# Patient Record
Sex: FEMALE | Race: BLACK OR AFRICAN AMERICAN | NOT HISPANIC OR LATINO
[De-identification: names, ages, dates, MRNs, and addresses within clinical notes are randomized per-mention and may not be internally consistent; named-entity substitution may affect disease eponyms.]

---

## 2017-07-12 ENCOUNTER — CHARTING TRANS (OUTPATIENT)
Dept: OTHER | Age: 22
End: 2017-07-12

## 2018-03-13 ENCOUNTER — OFFICE VISIT (OUTPATIENT)
Dept: INTERNAL MEDICINE CLINIC | Facility: CLINIC | Age: 23
End: 2018-03-13

## 2018-03-13 VITALS
OXYGEN SATURATION: 99 % | TEMPERATURE: 99 F | HEART RATE: 86 BPM | BODY MASS INDEX: 25.79 KG/M2 | SYSTOLIC BLOOD PRESSURE: 124 MMHG | WEIGHT: 147.38 LBS | DIASTOLIC BLOOD PRESSURE: 84 MMHG | HEIGHT: 63.5 IN

## 2018-03-13 DIAGNOSIS — L70.0 ACNE VULGARIS: ICD-10-CM

## 2018-03-13 DIAGNOSIS — Z00.00 ROUTINE HEALTH MAINTENANCE: Primary | ICD-10-CM

## 2018-03-13 DIAGNOSIS — R53.83 OTHER FATIGUE: ICD-10-CM

## 2018-03-13 DIAGNOSIS — M26.9 JAW DEFORMITY: ICD-10-CM

## 2018-03-13 PROCEDURE — 99385 PREV VISIT NEW AGE 18-39: CPT | Performed by: INTERNAL MEDICINE

## 2018-03-13 PROCEDURE — 90714 TD VACC NO PRESV 7 YRS+ IM: CPT | Performed by: INTERNAL MEDICINE

## 2018-03-13 PROCEDURE — 90471 IMMUNIZATION ADMIN: CPT | Performed by: INTERNAL MEDICINE

## 2018-03-13 NOTE — PROGRESS NOTES
Denis Cordero is a 25year old female. Patient presents with:  Establish Care: Previous PCP Dr Christine Katz through [facility]. Physical: Requests referral for orthogathic Surgery Evaluation. Current dentist is Dr Mack Castillo. Has never had PAP.    Headache      HPI: History:  around 2015: OTHER SURGICAL HISTORY Right      Comment: ganglion cyst removal  No date: WISDOM TEETH REMOVED   Family History   Problem Relation Age of Onset   • Thyroid disease Mother    • diverticulitis [OTHER] Maternal Grandmother    • Breast Dr. Kelly Shah oral surgeon    3.  Acne vulgaris  Refer to derm Dr. Millie Galicia MD  3/13/2018  2:50 PM

## 2018-03-17 ENCOUNTER — LAB ENCOUNTER (OUTPATIENT)
Dept: LAB | Age: 23
End: 2018-03-17
Attending: INTERNAL MEDICINE
Payer: COMMERCIAL

## 2018-03-17 DIAGNOSIS — R53.83 OTHER FATIGUE: ICD-10-CM

## 2018-03-17 DIAGNOSIS — Z00.00 ROUTINE HEALTH MAINTENANCE: ICD-10-CM

## 2018-03-17 LAB
ALBUMIN SERPL BCP-MCNC: 4.1 G/DL (ref 3.5–4.8)
ALBUMIN/GLOB SERPL: 1.2 {RATIO} (ref 1–2)
ALP SERPL-CCNC: 43 U/L (ref 32–100)
ALT SERPL-CCNC: 13 U/L (ref 14–54)
ANION GAP SERPL CALC-SCNC: 6 MMOL/L (ref 0–18)
AST SERPL-CCNC: 18 U/L (ref 15–41)
BASOPHILS # BLD: 0.1 K/UL (ref 0–0.2)
BASOPHILS NFR BLD: 1 %
BILIRUB SERPL-MCNC: 0.6 MG/DL (ref 0.3–1.2)
BUN SERPL-MCNC: 11 MG/DL (ref 8–20)
BUN/CREAT SERPL: 16.2 (ref 10–20)
CALCIUM SERPL-MCNC: 9 MG/DL (ref 8.5–10.5)
CHLORIDE SERPL-SCNC: 105 MMOL/L (ref 95–110)
CHOLEST SERPL-MCNC: 185 MG/DL (ref 110–200)
CO2 SERPL-SCNC: 24 MMOL/L (ref 22–32)
CREAT SERPL-MCNC: 0.68 MG/DL (ref 0.5–1.5)
EOSINOPHIL # BLD: 0.1 K/UL (ref 0–0.7)
EOSINOPHIL NFR BLD: 1 %
ERYTHROCYTE [DISTWIDTH] IN BLOOD BY AUTOMATED COUNT: 12.6 % (ref 11–15)
GLOBULIN PLAS-MCNC: 3.4 G/DL (ref 2.5–3.7)
GLUCOSE SERPL-MCNC: 85 MG/DL (ref 70–99)
HCT VFR BLD AUTO: 38.2 % (ref 35–48)
HDLC SERPL-MCNC: 85 MG/DL
HGB BLD-MCNC: 12.7 G/DL (ref 12–16)
LDLC SERPL CALC-MCNC: 90 MG/DL (ref 0–99)
LYMPHOCYTES # BLD: 1.9 K/UL (ref 1–4)
LYMPHOCYTES NFR BLD: 36 %
MCH RBC QN AUTO: 29.6 PG (ref 27–32)
MCHC RBC AUTO-ENTMCNC: 33.2 G/DL (ref 32–37)
MCV RBC AUTO: 89.1 FL (ref 80–100)
MONOCYTES # BLD: 0.5 K/UL (ref 0–1)
MONOCYTES NFR BLD: 9 %
NEUTROPHILS # BLD AUTO: 2.8 K/UL (ref 1.8–7.7)
NEUTROPHILS NFR BLD: 53 %
NONHDLC SERPL-MCNC: 100 MG/DL
OSMOLALITY UR CALC.SUM OF ELEC: 279 MOSM/KG (ref 275–295)
PATIENT FASTING: YES
PLATELET # BLD AUTO: 257 K/UL (ref 140–400)
PMV BLD AUTO: 8.1 FL (ref 7.4–10.3)
POTASSIUM SERPL-SCNC: 4.2 MMOL/L (ref 3.3–5.1)
PROT SERPL-MCNC: 7.5 G/DL (ref 5.9–8.4)
RBC # BLD AUTO: 4.28 M/UL (ref 3.7–5.4)
SODIUM SERPL-SCNC: 135 MMOL/L (ref 136–144)
TRIGL SERPL-MCNC: 49 MG/DL (ref 1–149)
WBC # BLD AUTO: 5.4 K/UL (ref 4–11)

## 2018-03-17 PROCEDURE — 36415 COLL VENOUS BLD VENIPUNCTURE: CPT

## 2018-03-17 PROCEDURE — 82306 VITAMIN D 25 HYDROXY: CPT

## 2018-03-17 PROCEDURE — 85025 COMPLETE CBC W/AUTO DIFF WBC: CPT

## 2018-03-17 PROCEDURE — 80053 COMPREHEN METABOLIC PANEL: CPT

## 2018-03-17 PROCEDURE — 80061 LIPID PANEL: CPT

## 2018-03-19 ENCOUNTER — TELEPHONE (OUTPATIENT)
Dept: INTERNAL MEDICINE CLINIC | Facility: CLINIC | Age: 23
End: 2018-03-19

## 2018-03-19 LAB — 25(OH)D3 SERPL-MCNC: 10.3 NG/ML

## 2018-03-19 NOTE — TELEPHONE ENCOUNTER
Per [facility], coverage for underbite is not medical and will fall under dental insurance. Referral was cancelled.

## 2018-04-02 ENCOUNTER — TELEPHONE (OUTPATIENT)
Dept: INTERNAL MEDICINE CLINIC | Facility: CLINIC | Age: 23
End: 2018-04-02

## 2018-04-02 DIAGNOSIS — E55.9 VITAMIN D DEFICIENCY: Primary | ICD-10-CM

## 2018-04-03 RX ORDER — CHOLECALCIFEROL (VITAMIN D3) 1250 MCG
1 CAPSULE ORAL WEEKLY
Qty: 13 CAPSULE | Refills: 3 | Status: SHIPPED | OUTPATIENT
Start: 2018-04-03 | End: 2018-05-03

## 2018-04-03 NOTE — TELEPHONE ENCOUNTER
Called patient with Dr. Kallie Reinoso message. Sent in the rx for   Vitamin D. Art expressed understanding.

## 2018-04-03 NOTE — TELEPHONE ENCOUNTER
Please let pt know that all her labs were normal, except her vitamin D level is very low (10). I'd like her to start taking Vitamin D 50,000 units/week (#13, 3 RF).   Repeat a level in 6 months (I'll order)

## 2018-04-18 NOTE — TELEPHONE ENCOUNTER
Patient calling back re: referral to correct her underbite  States she needs a medical determination for oral surgery from Dr Jerome Lopez  - needs detailed information   Provided orthodontists information to obtain the detailed information  Pt sees Dr James Weston

## 2018-04-20 NOTE — TELEPHONE ENCOUNTER
Patient requests call back today from Dr Lilia Chester nurse re: message she left on 4/18    Pt can be reached at [phone]

## 2018-04-20 NOTE — TELEPHONE ENCOUNTER
Spoke with patient and explained to her that underbite does not fall under a medical condition therefore her medical insurance will not cover. Her or her mom will need to contact her dental insurance to find out if they will cover this issue.   Patient stat

## 2018-05-02 NOTE — TELEPHONE ENCOUNTER
To  -   Called patients mom who tried to explain the whole situation , she will contact patients dentist DR. Piyush Callejas who will contact DR. JOHNSON Tuesday 5/8 and talk to her regarding what is needed

## 2018-05-14 ENCOUNTER — TELEPHONE (OUTPATIENT)
Dept: DERMATOLOGY CLINIC | Facility: CLINIC | Age: 23
End: 2018-05-14

## 2018-05-14 ENCOUNTER — OFFICE VISIT (OUTPATIENT)
Dept: DERMATOLOGY CLINIC | Facility: CLINIC | Age: 23
End: 2018-05-14

## 2018-05-14 DIAGNOSIS — L70.0 ACNE VULGARIS: Primary | ICD-10-CM

## 2018-05-14 PROCEDURE — 99202 OFFICE O/P NEW SF 15 MIN: CPT | Performed by: DERMATOLOGY

## 2018-05-14 PROCEDURE — 99212 OFFICE O/P EST SF 10 MIN: CPT | Performed by: DERMATOLOGY

## 2018-05-14 RX ORDER — DOXYCYCLINE HYCLATE 50 MG/1
50 CAPSULE ORAL 2 TIMES DAILY
Qty: 60 CAPSULE | Refills: 12 | Status: SHIPPED | OUTPATIENT
Start: 2018-05-14 | End: 2018-06-14

## 2018-05-14 RX ORDER — CHOLECALCIFEROL (VITAMIN D3) 1250 MCG
CAPSULE ORAL
COMMUNITY
End: 2019-01-10

## 2018-05-14 RX ORDER — CLINDAMYCIN PHOSPHATE AND TRETINOIN 10; .25 MG/G; MG/G
1 GEL TOPICAL NIGHTLY
Qty: 45 G | Refills: 3 | Status: SHIPPED | OUTPATIENT
Start: 2018-05-14 | End: 2018-06-14

## 2018-05-14 NOTE — TELEPHONE ENCOUNTER
Received fax from [employer] stating Clindamycin Tretinoin requires PA. (Fax in [facility]).

## 2018-05-15 NOTE — TELEPHONE ENCOUNTER
Called  Prime and Veltin is an excluded drug. 1% clinda and .025 tretinoin will go through insurance separately, but would be 70$ each. Only other option would be to appeal this medication. [phone]    Please advise.

## 2018-05-15 NOTE — TELEPHONE ENCOUNTER
Please try pa--may still need to change to generic tretinoin --+/- clindamycin pt on po so may not need topical antibiotic for now

## 2018-05-16 RX ORDER — ADAPALENE 3 MG/G
1 GEL TOPICAL NIGHTLY
Qty: 45 G | Refills: 12 | Status: SHIPPED | OUTPATIENT
Start: 2018-05-16 | End: 2018-06-14

## 2018-05-17 NOTE — TELEPHONE ENCOUNTER
differin 0.3% sent to [employer]. Please let pt know. If not covered will need other alternative.

## 2018-05-27 NOTE — PROGRESS NOTES
Odalis Miller is a 26year old female. Patient presents with:  Acne: new pt. referred by Dr. Kiesha Trevino for acne to face. Tried RX topical medication in past but doesn't remember what. States BPO irritate kalani skin.              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 Comedones grade 2-3 see medications in grid. Skin care regimen discussed at length including cleansers,otc products, face washing, sunscreen.   Recheck in 6 -8 weeks if no improvemen this encounter. 5/27/2018  Jory Paniagua      The patient indicates understanding of these issues and agrees to the plan.   The patient is asked to return as noted in follow-up / above

## 2018-06-04 ENCOUNTER — TELEPHONE (OUTPATIENT)
Dept: INTERNAL MEDICINE CLINIC | Facility: CLINIC | Age: 23
End: 2018-06-04

## 2018-06-14 ENCOUNTER — OFFICE VISIT (OUTPATIENT)
Dept: INTERNAL MEDICINE CLINIC | Facility: CLINIC | Age: 23
End: 2018-06-14

## 2018-06-14 VITALS
TEMPERATURE: 99 F | HEIGHT: 63.5 IN | OXYGEN SATURATION: 99 % | WEIGHT: 149 LBS | HEART RATE: 95 BPM | BODY MASS INDEX: 26.08 KG/M2 | SYSTOLIC BLOOD PRESSURE: 128 MMHG | DIASTOLIC BLOOD PRESSURE: 82 MMHG

## 2018-06-14 DIAGNOSIS — R51.9 TEMPORAL HEADACHE: Primary | ICD-10-CM

## 2018-06-14 DIAGNOSIS — L70.0 ACNE VULGARIS: ICD-10-CM

## 2018-06-14 PROCEDURE — 99212 OFFICE O/P EST SF 10 MIN: CPT | Performed by: INTERNAL MEDICINE

## 2018-06-14 PROCEDURE — 99214 OFFICE O/P EST MOD 30 MIN: CPT | Performed by: INTERNAL MEDICINE

## 2018-06-14 NOTE — PROGRESS NOTES
Pascale Ghosh is a 26year old female. Patient presents with:  Headache: Complaints of frequent headaches around the temporal area - since 1 year   Acne: Complaints of acne - needs dermatology referal for Dr. Compa Hi   Other: Needs referral for oral °F (37.2 °C) (Oral)   Ht 5' 3.5\" (1.613 m)   Wt 149 lb (67.6 kg)   LMP 05/23/2018 (Exact Date)   SpO2 99%   BMI 25.98 kg/m²   GENERAL: well developed, well nourished, in no apparent distress  HEENT: no temporal scalp tenderness  NECK: supple, no adenopath

## 2018-07-02 ENCOUNTER — TELEPHONE (OUTPATIENT)
Dept: INTERNAL MEDICINE CLINIC | Facility: CLINIC | Age: 23
End: 2018-07-02

## 2018-07-02 DIAGNOSIS — M26.9 JAW DEFORMITY: Primary | ICD-10-CM

## 2018-07-02 NOTE — TELEPHONE ENCOUNTER
Dr. Laurance Goodpasture' message relayed to pt who verbalized understanding.   Pt will call insurance company and call back with covered oral surgeon for referral.

## 2018-07-02 NOTE — TELEPHONE ENCOUNTER
Pt called to check that Dr Abraham Arita received letter from her orthodontist re: needing referral to oral surgeon  - pt was advised letter was received in doctors mail slot    Please call pt to confirm oral surgeon referrral [phone]

## 2018-07-03 NOTE — TELEPHONE ENCOUNTER
Pt called her insurance company, and according to her insurance, all oral surgeons were insured. Pt said Dr Lata Juarez recommended Dr Kishan Blake, and he is covered by her insurance if that's the route Dr Lata Juarez would like to go.  If we can please send over the referral and le

## 2018-07-03 NOTE — TELEPHONE ENCOUNTER
Referral status of \"Pend. \" To [facility] to complete and please fax to Dr. Yady Blanco at [phone].

## 2018-07-03 NOTE — TELEPHONE ENCOUNTER
To Dr. Giovanna Odell - see below - new referral for Dr. Prudencio Allen pended above  Pt given Dr. Berman Reusing name/phone number - understanding verbalized. Dr. Prudencio Allen fax: [phone], P) [phone].

## 2018-07-06 NOTE — TELEPHONE ENCOUNTER
Pt's mother Phani Check called to check status on referral for oral surgeon  Please call patient when authorized so she can schedule an appt    Endy Keller [phone]

## 2018-07-10 ENCOUNTER — TELEPHONE (OUTPATIENT)
Dept: INTERNAL MEDICINE CLINIC | Facility: CLINIC | Age: 23
End: 2018-07-10

## 2018-07-10 NOTE — TELEPHONE ENCOUNTER
Please fax documentation of the orthodontist's notes supporting request to see oral surgeon. Please fax documentation to [facility] at [phone].      Thank you, Lenard

## 2018-07-10 NOTE — TELEPHONE ENCOUNTER
To Dr. Cody White - see below - do you have orthodontist notes? Do not see under \"media\" or \"notes\".

## 2018-07-10 NOTE — TELEPHONE ENCOUNTER
To Dr. Mirna Burr - Orthodontist office called [phone] - they will refax to us orthodontist's note to support need for oral surgeon. Mariela Pretty will place on your desk when it arrives. They will also directly fax notes to [facility] - fax number below.

## 2018-07-17 ENCOUNTER — LAB ENCOUNTER (OUTPATIENT)
Dept: LAB | Facility: HOSPITAL | Age: 23
End: 2018-07-17
Attending: INTERNAL MEDICINE
Payer: COMMERCIAL

## 2018-07-17 ENCOUNTER — OFFICE VISIT (OUTPATIENT)
Dept: OBGYN CLINIC | Facility: CLINIC | Age: 23
End: 2018-07-17

## 2018-07-17 VITALS
HEART RATE: 92 BPM | DIASTOLIC BLOOD PRESSURE: 80 MMHG | SYSTOLIC BLOOD PRESSURE: 134 MMHG | BODY MASS INDEX: 26.05 KG/M2 | WEIGHT: 147 LBS | HEIGHT: 63 IN

## 2018-07-17 DIAGNOSIS — Z01.419 ENCOUNTER FOR GYNECOLOGICAL EXAMINATION WITHOUT ABNORMAL FINDING: Primary | ICD-10-CM

## 2018-07-17 DIAGNOSIS — Z11.3 SCREEN FOR STD (SEXUALLY TRANSMITTED DISEASE): ICD-10-CM

## 2018-07-17 DIAGNOSIS — E55.9 VITAMIN D DEFICIENCY: ICD-10-CM

## 2018-07-17 DIAGNOSIS — R51.9 TEMPORAL HEADACHE: ICD-10-CM

## 2018-07-17 LAB
CRP SERPL-MCNC: <0.5 MG/DL (ref 0–0.9)
ERYTHROCYTE [SEDIMENTATION RATE] IN BLOOD: 10 MM/HR (ref 0–20)
TSH SERPL-ACNC: 1.07 UIU/ML (ref 0.45–5.33)

## 2018-07-17 PROCEDURE — 36415 COLL VENOUS BLD VENIPUNCTURE: CPT

## 2018-07-17 PROCEDURE — 84443 ASSAY THYROID STIM HORMONE: CPT | Performed by: INTERNAL MEDICINE

## 2018-07-17 PROCEDURE — 86780 TREPONEMA PALLIDUM: CPT

## 2018-07-17 PROCEDURE — 85652 RBC SED RATE AUTOMATED: CPT

## 2018-07-17 PROCEDURE — 82306 VITAMIN D 25 HYDROXY: CPT

## 2018-07-17 PROCEDURE — 86140 C-REACTIVE PROTEIN: CPT

## 2018-07-17 PROCEDURE — 87389 HIV-1 AG W/HIV-1&-2 AB AG IA: CPT

## 2018-07-17 PROCEDURE — 99395 PREV VISIT EST AGE 18-39: CPT | Performed by: OBSTETRICS & GYNECOLOGY

## 2018-07-17 NOTE — TELEPHONE ENCOUNTER
Pt is checking on status of Referral, pt is waiting to schedule her melina,  Please call pt [phone] when complete, tasked to managed care high

## 2018-07-17 NOTE — PROGRESS NOTES
Ke Kendall is a 26year old female  Patient's last menstrual period was 2018 (approximate). Patient presents with:  Gyn Exam: NP, Annual  .   Her cycles are  regular. She has no complaints.   Not currently sexually active but would like s itching  Musculoskeletal:  denies back pain. Skin/Breast:  Denies any breast pain, lumps, or discharge. Neurological:  denies headaches, extremity weakness or numbness. Psychiatric: denies depression or anxiety.   Endocrine:   denies excessive thirst or

## 2018-07-18 LAB
25(OH)D3 SERPL-MCNC: 73.3 NG/ML
C TRACH DNA SPEC QL NAA+PROBE: NEGATIVE
HIV1+2 AB SERPL QL IA: NONREACTIVE
N GONORRHOEA DNA SPEC QL NAA+PROBE: NEGATIVE
T PALLIDUM AB SER QL: NEGATIVE
T VAGINALIS RRNA SPEC QL NAA+PROBE: NEGATIVE

## 2018-07-19 ENCOUNTER — TELEPHONE (OUTPATIENT)
Dept: INTERNAL MEDICINE CLINIC | Facility: CLINIC | Age: 23
End: 2018-07-19

## 2018-07-19 NOTE — TELEPHONE ENCOUNTER
Pt is calling regarding the MRI of Brain, pt still has not heard anything from the Ins for authorization  Pt was told to call office if she didn't hear.   Please call pt [phone]    Tasked to nursing

## 2018-07-20 NOTE — TELEPHONE ENCOUNTER
No authorization is needed for this patient because she has the [address]  doesn't receive this referrals because they auto approve. I will inform patient.     Thank you,   [address]

## 2018-07-21 NOTE — TELEPHONE ENCOUNTER
Per [facility]     \"I would need to know if the patient's general health is affected by this malocclusion. Is patient having difficulty with eating, pain, conservative treatment for the pain, etc.? \"     Thank you, Lenard

## 2018-07-23 ENCOUNTER — TELEPHONE (OUTPATIENT)
Dept: INTERNAL MEDICINE CLINIC | Facility: CLINIC | Age: 23
End: 2018-07-23

## 2018-07-23 DIAGNOSIS — M26.629 CHRONIC TMJ PAIN: Primary | ICD-10-CM

## 2018-07-23 DIAGNOSIS — G89.29 CHRONIC TMJ PAIN: Primary | ICD-10-CM

## 2018-07-24 NOTE — TELEPHONE ENCOUNTER
Pt is having TMJ pain -- that's what I know. This is not my area of expertise. I am not a dentist/orthodontist/oral surgeon. I have no idea if this is a progressive problem or what the long-term consequences are.   These really are questions that need to

## 2018-07-25 NOTE — TELEPHONE ENCOUNTER
I already sent a response back to Gloria, which essentially was that I have no clue and that her orthodontist needs to address those issues

## 2018-07-31 NOTE — TELEPHONE ENCOUNTER
[facility] address her question upon her return next week.

## 2018-07-31 NOTE — TELEPHONE ENCOUNTER
Per [facility]     [employer] will not cover a orthodontist visit to assess patient. But if PCP is referring for the TMJ pain the diagnosis code will need to be updated and we will be able to process for Oral Surgery. Please advise. \"

## 2018-07-31 NOTE — TELEPHONE ENCOUNTER
(on call) would you be able to advise on this or ok to wait for . Ok to update the diagnosis code to TMJ pain?

## 2018-08-02 ENCOUNTER — HOSPITAL ENCOUNTER (OUTPATIENT)
Dept: MRI IMAGING | Facility: HOSPITAL | Age: 23
Discharge: HOME | End: 2018-08-02
Attending: INTERNAL MEDICINE
Payer: COMMERCIAL

## 2018-08-02 DIAGNOSIS — R51.9 TEMPORAL HEADACHE: ICD-10-CM

## 2018-08-02 PROCEDURE — A9576 INJ PROHANCE MULTIPACK: HCPCS | Performed by: INTERNAL MEDICINE

## 2018-08-02 PROCEDURE — 70553 MRI BRAIN STEM W/O & W/DYE: CPT | Performed by: INTERNAL MEDICINE

## 2018-08-05 NOTE — TELEPHONE ENCOUNTER
Here is what I need:  Call patient and ask her EXACTLY what her symptoms are from this jaw malocclusion (TMJ pain? Chewing difficulty? Speech difficulty?).   Then please type a letter to [employer] outlining all of her symptoms and why (medically

## 2018-08-06 NOTE — TELEPHONE ENCOUNTER
Spoke with pt: Pt states chewing difficulty is a major problem - instead of using front teeth to bite, pt has to use back teeth. Only 4 upper teeth (2 on right, 2 on left) that make connection to bottom teeth. Severe underbite.   To Lenard/managed care - see

## 2018-08-07 NOTE — TELEPHONE ENCOUNTER
Hi, The diagnosis code on this referral is not covered under the medical diagnosis (per ins co guidelines) and can not be auth'd. However, Dr's notes state the pt has TMJ pain. TMJ pain is covered under medical necessity and would be auth'd.  If the diagnos

## 2018-11-03 VITALS
BODY MASS INDEX: 26.22 KG/M2 | RESPIRATION RATE: 18 BRPM | HEART RATE: 88 BPM | SYSTOLIC BLOOD PRESSURE: 124 MMHG | HEIGHT: 63 IN | OXYGEN SATURATION: 98 % | TEMPERATURE: 98.3 F | DIASTOLIC BLOOD PRESSURE: 82 MMHG | WEIGHT: 148 LBS

## 2019-01-03 ENCOUNTER — OFFICE VISIT (OUTPATIENT)
Dept: INTERNAL MEDICINE CLINIC | Facility: CLINIC | Age: 24
End: 2019-01-03

## 2019-01-03 VITALS
HEART RATE: 110 BPM | TEMPERATURE: 98 F | SYSTOLIC BLOOD PRESSURE: 132 MMHG | WEIGHT: 148 LBS | BODY MASS INDEX: 26 KG/M2 | DIASTOLIC BLOOD PRESSURE: 86 MMHG | OXYGEN SATURATION: 98 %

## 2019-01-03 DIAGNOSIS — M67.432 GANGLION CYST OF DORSUM OF LEFT WRIST: Primary | ICD-10-CM

## 2019-01-03 PROCEDURE — 99213 OFFICE O/P EST LOW 20 MIN: CPT | Performed by: INTERNAL MEDICINE

## 2019-01-03 PROCEDURE — 90471 IMMUNIZATION ADMIN: CPT | Performed by: INTERNAL MEDICINE

## 2019-01-03 PROCEDURE — 90686 IIV4 VACC NO PRSV 0.5 ML IM: CPT | Performed by: INTERNAL MEDICINE

## 2019-01-03 NOTE — PROGRESS NOTES
Abbie Wang is a 26year old female. Patient presents with:  Cyst: patient believes she has a ganglion cyst on left wrist.     HPI:     Pt notes a ganglion cyst on left dorsal wrist.  Has had for awhile but now getting bigger and causing discomfort.   Had

## 2019-01-10 ENCOUNTER — OFFICE VISIT (OUTPATIENT)
Dept: SURGERY | Facility: CLINIC | Age: 24
End: 2019-01-10

## 2019-01-10 DIAGNOSIS — M67.432 GANGLION OF LEFT WRIST: Primary | ICD-10-CM

## 2019-01-10 PROCEDURE — 99243 OFF/OP CNSLTJ NEW/EST LOW 30: CPT | Performed by: PLASTIC SURGERY

## 2019-01-10 PROCEDURE — 99212 OFFICE O/P EST SF 10 MIN: CPT | Performed by: PLASTIC SURGERY

## 2019-01-10 RX ORDER — HYDROCODONE BITARTRATE AND ACETAMINOPHEN 7.5; 325 MG/1; MG/1
TABLET ORAL
Qty: 25 TABLET | Refills: 0 | Status: SHIPPED | OUTPATIENT
Start: 2019-01-10 | End: 2019-06-03 | Stop reason: WASHOUT

## 2019-01-10 NOTE — H&P
Donna Swanson is a 26year old female that presents with Patient presents with:  Ganglion: Left dorsal wrist  .    REFERRED BY:  Mily Pineda    Pacemaker: No  Latex Allergy: no  Coumadin: No  Plavix: No  Other anticoagulants: No  Cardiac stents: No Transportation needs - medical: Not on file      Transportation needs - non-medical: Not on file    Occupational History      Not on file    Tobacco Use      Smoking status: Never Smoker      Smokeless tobacco: Never Used    Substance and Sexual Activity 3 cm transverse scar    ASSESSMENT/PLAN:     GANGLION    left dorsal hand    We discussed what a ganglion/mass is, including treatment options. Questions were answered and the patient wishes to proceed with treatment.      This ganglion/mass should be exci

## 2019-02-04 ENCOUNTER — TELEPHONE (OUTPATIENT)
Dept: INTERNAL MEDICINE CLINIC | Facility: CLINIC | Age: 24
End: 2019-02-04

## 2019-02-04 DIAGNOSIS — M26.02 MAXILLARY HYPOPLASIA: Primary | ICD-10-CM

## 2019-02-04 DIAGNOSIS — M26.03 MANDIBULAR HYPERPLASIA: ICD-10-CM

## 2019-02-04 NOTE — TELEPHONE ENCOUNTER
[facility] asking for referral Dr.Phillip Reno Louis  CT scan in office and revaluation    [facility]#[id number]  Ph# [phone] Rebecca Irwin

## 2019-02-04 NOTE — TELEPHONE ENCOUNTER
Called [facility] and s/w Jennifer Morrison who states another referral is needed for re-eval and for patient to have CT/head in preparation for upcoming oral surgery, not yet scheduled. States initial consult referral came from Dr. Lei Spicer.      To DR ELIZABETH romero

## 2019-03-13 ENCOUNTER — TELEPHONE (OUTPATIENT)
Dept: INTERNAL MEDICINE CLINIC | Facility: CLINIC | Age: 24
End: 2019-03-13

## 2019-03-13 NOTE — TELEPHONE ENCOUNTER
[facility] called to advise -     Pt is not going to see Dr Charlotte Reyes anymore &  was given referrals with letters & her records to see    Dr Lance Isabel @ [facility]      or    [facility]ib[facility] [place] is not sure pt

## 2019-03-13 NOTE — TELEPHONE ENCOUNTER
Routed to managed care-- is this something that you can help with? Or just have patient call herself? Please advise.  Thanks!!

## 2019-03-13 NOTE — TELEPHONE ENCOUNTER
I don't know either. That is the pt's responsibility to find out. She can call them and decide who she wants (and is able) to see, and then let us know.

## 2019-03-14 ENCOUNTER — OFFICE VISIT (OUTPATIENT)
Dept: INTERNAL MEDICINE CLINIC | Facility: CLINIC | Age: 24
End: 2019-03-14

## 2019-03-14 ENCOUNTER — TELEPHONE (OUTPATIENT)
Dept: INTERNAL MEDICINE CLINIC | Facility: CLINIC | Age: 24
End: 2019-03-14

## 2019-03-14 VITALS
HEIGHT: 63 IN | HEART RATE: 86 BPM | BODY MASS INDEX: 26.93 KG/M2 | SYSTOLIC BLOOD PRESSURE: 140 MMHG | WEIGHT: 152 LBS | DIASTOLIC BLOOD PRESSURE: 108 MMHG | TEMPERATURE: 98 F | OXYGEN SATURATION: 99 %

## 2019-03-14 DIAGNOSIS — M26.213 MALOCCLUSION, ANGLE'S CLASS III: Primary | ICD-10-CM

## 2019-03-14 DIAGNOSIS — R03.0 ELEVATED BLOOD PRESSURE READING: ICD-10-CM

## 2019-03-14 DIAGNOSIS — M26.29 MALOCCLUSION WITH OPEN BITE: ICD-10-CM

## 2019-03-14 PROCEDURE — 99212 OFFICE O/P EST SF 10 MIN: CPT | Performed by: INTERNAL MEDICINE

## 2019-03-14 PROCEDURE — 99213 OFFICE O/P EST LOW 20 MIN: CPT | Performed by: INTERNAL MEDICINE

## 2019-03-14 NOTE — TELEPHONE ENCOUNTER
Below are the in network Oral and Maxillofacial surgeons.      Thank you,    [facility]

## 2019-03-14 NOTE — PROGRESS NOTES
Manoj Torres is a 26year old female. Patient presents with:  Referral: Pt needs a referral for oral surgeon - pt needs jaw surgery to correct her underbite.      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 Malocclusion with open bite  Pt given referral to Dr. Nisha Gleason -- in network OM. She also requests referrals to Arjun Duran/[facility], although they do not appear to be in network    3.  Elevated blood pressur

## 2019-03-14 NOTE — TELEPHONE ENCOUNTER
Please fax her a list of oral surgeons in her network (see previous TT note). Have her or her mom call and find out which one of them, if any, do the procedure.   I can then do the proper referral.  If none of them do it, then she may need to go out of net

## 2019-03-14 NOTE — TELEPHONE ENCOUNTER
Pt was seen by Dr Ciarra Beyer today, was given referral for a oral surgeon, Dr Vida Cloud  This surgeon does not do the type of surgery pt needs  Please call with addt'l recommendations  Tasked to nursing

## 2019-03-14 NOTE — TELEPHONE ENCOUNTER
Spoke with patient and relayed Dr. Tima Tao message. Patient verbalized understanding. She will check on in-network doctors first and then let us know if she needs to go out of network.  I sent her a Madeleine Markett message with the names on the in-network oral surgeons

## 2019-03-16 NOTE — TELEPHONE ENCOUNTER
The referral to Dr. Rajwinder Owusu will be reviewed by Eduar Schmitz for medical necessity, all the other providers are out of network.      Thank you, Loretta Gilliland.

## 2019-04-09 ENCOUNTER — PATIENT MESSAGE (OUTPATIENT)
Dept: INTERNAL MEDICINE CLINIC | Facility: CLINIC | Age: 24
End: 2019-04-09

## 2019-04-10 NOTE — TELEPHONE ENCOUNTER
From: Mikki Kayser  To: Philip Le MD  Sent: 4/9/2019 7:50 PM CDT  Subject: Other    Hi Dr. Giovanna Odell,    I was wondering what needs to be done on your part about getting approval for my jaw surgery out of network.  The doctor that your wrote the re

## 2019-05-14 ENCOUNTER — OFFICE VISIT (OUTPATIENT)
Dept: INTERNAL MEDICINE CLINIC | Facility: CLINIC | Age: 24
End: 2019-05-14

## 2019-05-14 VITALS
WEIGHT: 152 LBS | TEMPERATURE: 98 F | BODY MASS INDEX: 26.93 KG/M2 | DIASTOLIC BLOOD PRESSURE: 90 MMHG | OXYGEN SATURATION: 98 % | SYSTOLIC BLOOD PRESSURE: 130 MMHG | HEART RATE: 89 BPM | HEIGHT: 63 IN

## 2019-05-14 DIAGNOSIS — M26.29 MALOCCLUSION WITH OPEN BITE: ICD-10-CM

## 2019-05-14 DIAGNOSIS — Z00.00 ROUTINE HEALTH MAINTENANCE: ICD-10-CM

## 2019-05-14 DIAGNOSIS — I10 HYPERTENSION, UNSPECIFIED TYPE: ICD-10-CM

## 2019-05-14 DIAGNOSIS — M26.213 MALOCCLUSION, ANGLE'S CLASS III: Primary | ICD-10-CM

## 2019-05-14 PROCEDURE — 99214 OFFICE O/P EST MOD 30 MIN: CPT | Performed by: INTERNAL MEDICINE

## 2019-05-14 PROCEDURE — 99212 OFFICE O/P EST SF 10 MIN: CPT | Performed by: INTERNAL MEDICINE

## 2019-05-14 RX ORDER — CEPHALEXIN 500 MG/1
500 CAPSULE ORAL 3 TIMES DAILY
COMMUNITY
End: 2019-06-03 | Stop reason: ALTCHOICE

## 2019-05-14 NOTE — PROGRESS NOTES
Leonard Garcia is a 22year old female. Patient presents with:  Referral: Patient would like to discuss referral for oral surgeon. She states she was referred to Dr. Dario Nance but he does not perform the procedure she needs.  She wonders if she needs to go ou 149 lb (67.6 kg)    Body mass index is 26.93 kg/m².       EXAM:   /90 (BP Location: Right arm, Patient Position: Sitting, Cuff Size: adult)   Pulse 89   Temp 98.2 °F (36.8 °C) (Oral)   Ht 5' 3\" (1.6 m)   Wt 152 lb (68.9 kg)   SpO2 98%   BMI 26.93 kg/

## 2019-05-20 ENCOUNTER — TELEPHONE (OUTPATIENT)
Dept: CASE MANAGEMENT | Age: 24
End: 2019-05-20

## 2019-05-20 ENCOUNTER — TELEPHONE (OUTPATIENT)
Dept: SURGERY | Facility: CLINIC | Age: 24
End: 2019-05-20

## 2019-05-20 NOTE — TELEPHONE ENCOUNTER
Dr. Sherlyn Sylvester,     Patient has seen Dr. Mimi Mayer was scheduled for Leforte I maxillary osteotomy, Bilateral sagittal split osteotomy, model surgery/measurements surgery in January. I'm not sure if she had the surgery. However, if there is an in-network provider equipped to perform the surgery, [facility] will not allow an out of network provider. [facility] is completely out of network. To process this referral, we need Dr. Isatu Coates office notes stating he can't perform this surgery with a notation that he has referred her to Dr. Darlys Hodgkin at [facility], patient can't self-direct care. Referral will be canceled until this information is received, reviewed and approved by you.      Thank you,   [address]

## 2019-05-21 NOTE — TELEPHONE ENCOUNTER
I have literally entered about 6 different referrals for this patient b/c each MD she calls or sees then tells her he/she does not do that procedure. I have no freaking clue who does this procedure because I have never even heard of it. 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 This has been beyond a waste of my time. Please please please help me to find someone who does this procedure -- whether in network or not. I don't give a crap. This poor girl has been dealing with this for the past year. Shameful.

## 2019-05-22 NOTE — TELEPHONE ENCOUNTER
I have spoke to Dr. Ramya Melton office, they has stated they are awaiting for the patient to complete her Orthodontic care. Once completed, he can do the surgery. I've asked them to send over the consultation notes for your review. With all things considered, you are able to speak to Dr. Je Enciso ([facility]) to provide the information you gave me. He is the decision-making in allowing out of network providers.

## 2019-05-23 ENCOUNTER — PATIENT MESSAGE (OUTPATIENT)
Dept: INTERNAL MEDICINE CLINIC | Facility: CLINIC | Age: 24
End: 2019-05-23

## 2019-05-23 ENCOUNTER — TELEPHONE (OUTPATIENT)
Dept: INTERNAL MEDICINE CLINIC | Facility: CLINIC | Age: 24
End: 2019-05-23

## 2019-05-23 DIAGNOSIS — M26.213 MALOCCLUSION, ANGLE'S CLASS III: Primary | ICD-10-CM

## 2019-05-23 DIAGNOSIS — M26.29 MALOCCLUSION WITH OPEN BITE: ICD-10-CM

## 2019-05-23 NOTE — TELEPHONE ENCOUNTER
From: Roxanne Betancourt  To: Anselmo Morse MD  Sent: 5/23/2019 3:59 PM CDT  Subject: Referral Request    Hi Dr. Zulema Brown,     Both referrals were recently cancelled, which lead to us calling [place] providers to see why.  They said that in order to get a

## 2019-05-23 NOTE — TELEPHONE ENCOUNTER
Vira message from 5/23 copied in to TT. \"Hi Dr. Marco Kunz,         Both referrals were recently cancelled, which lead to us calling [place] providers to see why.  They said that in order to get a referral approved you would have to submit and new re

## 2019-05-28 NOTE — TELEPHONE ENCOUNTER
From [facility] note from Negra Duncan on referral:  Kami Delvallee will be canceled as per [facility] and [facility] pt is self directing and was not referred out by Dr. Shara Monson.  [facility] checked with Dr. Shara Monson office and Dr. Shara Monson can do the procedure and is not referr

## 2019-05-30 NOTE — TELEPHONE ENCOUNTER
Spoke with patient who reports that her mom tried to reach Dr. Mireles Mouse office to see if he will perform surgery, but he will be out of office until next Wednesday.  They plan on calling him again after he is in office and will follow up with us after they get

## 2019-06-03 ENCOUNTER — OFFICE VISIT (OUTPATIENT)
Dept: SURGERY | Facility: CLINIC | Age: 24
End: 2019-06-03

## 2019-06-03 DIAGNOSIS — M67.432 GANGLION OF LEFT WRIST: Primary | ICD-10-CM

## 2019-06-03 PROCEDURE — 99212 OFFICE O/P EST SF 10 MIN: CPT | Performed by: PLASTIC SURGERY

## 2019-06-03 PROCEDURE — 99214 OFFICE O/P EST MOD 30 MIN: CPT | Performed by: PLASTIC SURGERY

## 2019-06-03 RX ORDER — HYDROCODONE BITARTRATE AND ACETAMINOPHEN 7.5; 325 MG/1; MG/1
TABLET ORAL
Qty: 25 TABLET | Refills: 0 | Status: SHIPPED | OUTPATIENT
Start: 2019-06-03 | End: 2019-07-18

## 2019-06-03 NOTE — PROGRESS NOTES
Pt here for ganglion cyst to left dorsal wrist  Symptoms the same, no changes since last office visit 1/10/19.   Pt here to discuss and schedule surgery

## 2019-06-03 NOTE — H&P
Pacemaker: No  Latex Allergy: no  Coumadin: No  Plavix: No  Other anticoagulants: No  Cardiac stents: No    HAND DOMINANCE:  Right  Profession: Student    RECONSTRUCTIVE HISTORY    SUN EXPOSURE   Current yes   Past yes   Sunburns no   Tanning salons curr permanent pain, stiffness, weakness, and loss of function. Even with satisfactory healing, the hand/digit may not regain normal ROM or normal function.             UPDATED PMH:         Current Outpatient Medications:  HYDROcodone-acetaminophen 7.5-325 MG activity: Not on file    Other Topics      Concerns:        [place]rew up [place] a farm: Not Asked        History of tanning: Not Asked        Outdoor occupation: Not Asked        Pt has a pacemaker: No        Pt has a defibrillator: No        Breast feeding: Not As

## 2019-06-05 ENCOUNTER — TELEPHONE (OUTPATIENT)
Dept: INTERNAL MEDICINE CLINIC | Facility: CLINIC | Age: 24
End: 2019-06-05

## 2019-06-05 DIAGNOSIS — M26.29 MALOCCLUSION WITH OPEN BITE: ICD-10-CM

## 2019-06-05 DIAGNOSIS — M26.213 MALOCCLUSION, ANGLE'S CLASS III: Primary | ICD-10-CM

## 2019-06-05 NOTE — TELEPHONE ENCOUNTER
Dr Danisha Sanders office faxed over his notes on the pt. The fax was placed in Dr Hannah Paulino' mailbox.

## 2019-06-05 NOTE — TELEPHONE ENCOUNTER
Referral done for Dr. Bri Brown.     Please ask pt to send (fax or bring to office)  Dr. Pretty Innocent referral and office notes and then send to [facility] as below (She cannot email them, as we do not have email capabilities)

## 2019-06-05 NOTE — TELEPHONE ENCOUNTER
Dr. Nitin Monson office notes faxed to [facility]; fax confirmation received. Notes to Dr. Ottoniel Sal for review. Routed to Managed care.

## 2019-06-05 NOTE — TELEPHONE ENCOUNTER
[facility]  My mom spoke with Dr. Karen Betancourt office and he still referred me to see Dr. Elizabeth Alston of [facility].  He also made an official referral. I was wondering if you could redo the referral for him, including Dr. Karen Betancourt clinical notes, and his r

## 2019-06-06 NOTE — TELEPHONE ENCOUNTER
Spoke to Tang Wallis at [facility]. She doesn't need us to correct referral.  Asked that we refax  [address] notes. Notes faxed and confirmation received.

## 2019-06-06 NOTE — TELEPHONE ENCOUNTER
Mother called, asking that we correct referral for Dr. Eli Velasquez. Needs to state external and urgent. Asked that we fax Dr. Mark Mccall office notes to managed care.

## 2019-06-06 NOTE — TELEPHONE ENCOUNTER
Marj Chávez from managed care calling. Received Dr. Aline Burnette notes. Note does not state he can not do procedure or that he is referring patient to Dr. Raj Oliveira.   Note is stating that patient is to follow up with him March 29th, and after ortho dental care proced

## 2019-06-06 NOTE — TELEPHONE ENCOUNTER
Diana's/Carey's message relayed to pt's mother Valente Bob who verbalized understanding. She will call Dr. Maricruz Gibbons office.

## 2019-06-12 ENCOUNTER — TELEPHONE (OUTPATIENT)
Dept: SURGERY | Facility: CLINIC | Age: 24
End: 2019-06-12

## 2019-06-12 DIAGNOSIS — M67.432 GANGLION OF LEFT WRIST: Primary | ICD-10-CM

## 2019-06-13 NOTE — TELEPHONE ENCOUNTER
Please call pt mother   Dr Sheeba Cazares (oral surgeon)  is out of network  Need notes from Dr Eva Arnold and Dr Jeimy Beal \"updated\" notes along with out-of-network referral   Based on the updated notes Dr Louis Mcmillan cannot do the surgery and is referring pt to Kassandra and

## 2019-06-17 NOTE — TELEPHONE ENCOUNTER
Pt called - she just spoke to Dr Ct Tai and they will be faxing information to us needed for referral    This needs to be forwarded to managed care    For  a new referral with the correct code

## 2019-06-21 NOTE — TELEPHONE ENCOUNTER
Called patient and relayed message that referral was re- entered and is going to managed care -left on VM per hipaa To managed care please help with this and keep us and patient updated thanks

## 2019-06-21 NOTE — TELEPHONE ENCOUNTER
Updated clinical from DR. Eddy Check faxed to Sita Freeman from [facility] at [phone]- confirmation recieved  As FYI to DR. ELIZABETH Irizarry to scanning

## 2019-06-21 NOTE — TELEPHONE ENCOUNTER
Okay, I have re-entered the referral to Dr. Dalila Alexis. Dr. Narda Kent updated note has been placed in my outbox -- What do we do with it? Scan it? Sounds like we need to get it to managed care somehow.   Tasked also to [facility] care

## 2019-06-21 NOTE — TELEPHONE ENCOUNTER
Please fax the updated clinical that Dr. Kit Mane has from Dr. Reno Louis to my attention at [phone]. It will be sent over to her HMO for review. If there is any additional clinical needs from the HMO, I will send it over for Dr. Kit Mane' review.

## 2019-06-25 ENCOUNTER — ANESTHESIA EVENT (OUTPATIENT)
Dept: SURGERY | Facility: HOSPITAL | Age: 24
End: 2019-06-25
Payer: COMMERCIAL

## 2019-06-25 ENCOUNTER — HOSPITAL DOCUMENTATION (OUTPATIENT)
Dept: SURGERY | Facility: CLINIC | Age: 24
End: 2019-06-25

## 2019-06-25 ENCOUNTER — HOSPITAL ENCOUNTER (OUTPATIENT)
Facility: HOSPITAL | Age: 24
Setting detail: HOSPITAL OUTPATIENT SURGERY
Discharge: HOME | End: 2019-06-25
Attending: PLASTIC SURGERY | Admitting: PLASTIC SURGERY
Payer: COMMERCIAL

## 2019-06-25 ENCOUNTER — ANESTHESIA (OUTPATIENT)
Dept: SURGERY | Facility: HOSPITAL | Age: 24
End: 2019-06-25
Payer: COMMERCIAL

## 2019-06-25 VITALS
SYSTOLIC BLOOD PRESSURE: 116 MMHG | HEART RATE: 60 BPM | DIASTOLIC BLOOD PRESSURE: 74 MMHG | HEIGHT: 64 IN | TEMPERATURE: 97 F | WEIGHT: 150.81 LBS | OXYGEN SATURATION: 99 % | RESPIRATION RATE: 15 BRPM | BODY MASS INDEX: 25.75 KG/M2

## 2019-06-25 DIAGNOSIS — M67.432 GANGLION OF LEFT WRIST: Primary | ICD-10-CM

## 2019-06-25 PROCEDURE — 25111 REMOVE WRIST TENDON LESION: CPT | Performed by: PLASTIC SURGERY

## 2019-06-25 PROCEDURE — 0LB60ZZ EXCISION OF LEFT LOWER ARM AND WRIST TENDON, OPEN APPROACH: ICD-10-PCS | Performed by: PLASTIC SURGERY

## 2019-06-25 RX ORDER — DEXAMETHASONE SODIUM PHOSPHATE 4 MG/ML
VIAL (ML) INJECTION AS NEEDED
Status: DISCONTINUED | OUTPATIENT
Start: 2019-06-25 | End: 2019-06-25 | Stop reason: SURG

## 2019-06-25 RX ORDER — MIDAZOLAM HYDROCHLORIDE 1 MG/ML
INJECTION INTRAMUSCULAR; INTRAVENOUS AS NEEDED
Status: DISCONTINUED | OUTPATIENT
Start: 2019-06-25 | End: 2019-06-25 | Stop reason: SURG

## 2019-06-25 RX ORDER — HYDROMORPHONE HYDROCHLORIDE 1 MG/ML
0.4 INJECTION, SOLUTION INTRAMUSCULAR; INTRAVENOUS; SUBCUTANEOUS EVERY 5 MIN PRN
Status: DISCONTINUED | OUTPATIENT
Start: 2019-06-25 | End: 2019-06-25

## 2019-06-25 RX ORDER — KETOROLAC TROMETHAMINE 30 MG/ML
INJECTION, SOLUTION INTRAMUSCULAR; INTRAVENOUS AS NEEDED
Status: DISCONTINUED | OUTPATIENT
Start: 2019-06-25 | End: 2019-06-25 | Stop reason: SURG

## 2019-06-25 RX ORDER — ACETAMINOPHEN 500 MG
1000 TABLET ORAL ONCE
Status: COMPLETED | OUTPATIENT
Start: 2019-06-25 | End: 2019-06-25

## 2019-06-25 RX ORDER — MORPHINE SULFATE 4 MG/ML
4 INJECTION, SOLUTION INTRAMUSCULAR; INTRAVENOUS EVERY 10 MIN PRN
Status: DISCONTINUED | OUTPATIENT
Start: 2019-06-25 | End: 2019-06-25

## 2019-06-25 RX ORDER — HALOPERIDOL 5 MG/ML
0.25 INJECTION INTRAMUSCULAR ONCE AS NEEDED
Status: DISCONTINUED | OUTPATIENT
Start: 2019-06-25 | End: 2019-06-25

## 2019-06-25 RX ORDER — HYDROMORPHONE HYDROCHLORIDE 1 MG/ML
0.6 INJECTION, SOLUTION INTRAMUSCULAR; INTRAVENOUS; SUBCUTANEOUS EVERY 5 MIN PRN
Status: DISCONTINUED | OUTPATIENT
Start: 2019-06-25 | End: 2019-06-25

## 2019-06-25 RX ORDER — METOCLOPRAMIDE 10 MG/1
10 TABLET ORAL ONCE
Status: DISCONTINUED | OUTPATIENT
Start: 2019-06-25 | End: 2019-06-25 | Stop reason: HOSPADM

## 2019-06-25 RX ORDER — SODIUM CHLORIDE, SODIUM LACTATE, POTASSIUM CHLORIDE, CALCIUM CHLORIDE 600; 310; 30; 20 MG/100ML; MG/100ML; MG/100ML; MG/100ML
INJECTION, SOLUTION INTRAVENOUS CONTINUOUS
Status: DISCONTINUED | OUTPATIENT
Start: 2019-06-25 | End: 2019-06-25

## 2019-06-25 RX ORDER — HYDROCODONE BITARTRATE AND ACETAMINOPHEN 5; 325 MG/1; MG/1
1 TABLET ORAL AS NEEDED
Status: DISCONTINUED | OUTPATIENT
Start: 2019-06-25 | End: 2019-06-25

## 2019-06-25 RX ORDER — LIDOCAINE HYDROCHLORIDE 10 MG/ML
INJECTION, SOLUTION EPIDURAL; INFILTRATION; INTRACAUDAL; PERINEURAL AS NEEDED
Status: DISCONTINUED | OUTPATIENT
Start: 2019-06-25 | End: 2019-06-25 | Stop reason: SURG

## 2019-06-25 RX ORDER — HYDROCODONE BITARTRATE AND ACETAMINOPHEN 5; 325 MG/1; MG/1
2 TABLET ORAL AS NEEDED
Status: DISCONTINUED | OUTPATIENT
Start: 2019-06-25 | End: 2019-06-25

## 2019-06-25 RX ORDER — HYDROCODONE BITARTRATE AND ACETAMINOPHEN 7.5; 325 MG/1; MG/1
1 TABLET ORAL EVERY 4 HOURS PRN
Status: DISCONTINUED | OUTPATIENT
Start: 2019-06-25 | End: 2019-06-25

## 2019-06-25 RX ORDER — MORPHINE SULFATE 2 MG/ML
2 INJECTION, SOLUTION INTRAMUSCULAR; INTRAVENOUS EVERY 10 MIN PRN
Status: DISCONTINUED | OUTPATIENT
Start: 2019-06-25 | End: 2019-06-25

## 2019-06-25 RX ORDER — HYDROMORPHONE HYDROCHLORIDE 1 MG/ML
0.2 INJECTION, SOLUTION INTRAMUSCULAR; INTRAVENOUS; SUBCUTANEOUS EVERY 5 MIN PRN
Status: DISCONTINUED | OUTPATIENT
Start: 2019-06-25 | End: 2019-06-25

## 2019-06-25 RX ORDER — FAMOTIDINE 20 MG/1
20 TABLET ORAL ONCE
Status: DISCONTINUED | OUTPATIENT
Start: 2019-06-25 | End: 2019-06-25 | Stop reason: HOSPADM

## 2019-06-25 RX ORDER — ONDANSETRON 2 MG/ML
4 INJECTION INTRAMUSCULAR; INTRAVENOUS ONCE AS NEEDED
Status: DISCONTINUED | OUTPATIENT
Start: 2019-06-25 | End: 2019-06-25

## 2019-06-25 RX ORDER — NALOXONE HYDROCHLORIDE 0.4 MG/ML
80 INJECTION, SOLUTION INTRAMUSCULAR; INTRAVENOUS; SUBCUTANEOUS AS NEEDED
Status: DISCONTINUED | OUTPATIENT
Start: 2019-06-25 | End: 2019-06-25

## 2019-06-25 RX ORDER — ONDANSETRON 2 MG/ML
INJECTION INTRAMUSCULAR; INTRAVENOUS AS NEEDED
Status: DISCONTINUED | OUTPATIENT
Start: 2019-06-25 | End: 2019-06-25 | Stop reason: SURG

## 2019-06-25 RX ORDER — MORPHINE SULFATE 10 MG/ML
6 INJECTION, SOLUTION INTRAMUSCULAR; INTRAVENOUS EVERY 10 MIN PRN
Status: DISCONTINUED | OUTPATIENT
Start: 2019-06-25 | End: 2019-06-25

## 2019-06-25 RX ORDER — PROCHLORPERAZINE EDISYLATE 5 MG/ML
5 INJECTION INTRAMUSCULAR; INTRAVENOUS ONCE AS NEEDED
Status: DISCONTINUED | OUTPATIENT
Start: 2019-06-25 | End: 2019-06-25

## 2019-06-25 RX ADMIN — ONDANSETRON 4 MG: 2 INJECTION INTRAMUSCULAR; INTRAVENOUS at 11:39:00

## 2019-06-25 RX ADMIN — DEXAMETHASONE SODIUM PHOSPHATE 4 MG: 4 MG/ML VIAL (ML) INJECTION at 11:39:00

## 2019-06-25 RX ADMIN — KETOROLAC TROMETHAMINE 30 MG: 30 INJECTION, SOLUTION INTRAMUSCULAR; INTRAVENOUS at 12:10:00

## 2019-06-25 RX ADMIN — MIDAZOLAM HYDROCHLORIDE 2 MG: 1 INJECTION INTRAMUSCULAR; INTRAVENOUS at 11:29:00

## 2019-06-25 RX ADMIN — SODIUM CHLORIDE, SODIUM LACTATE, POTASSIUM CHLORIDE, CALCIUM CHLORIDE: 600; 310; 30; 20 INJECTION, SOLUTION INTRAVENOUS at 12:15:00

## 2019-06-25 RX ADMIN — LIDOCAINE HYDROCHLORIDE 50 MG: 10 INJECTION, SOLUTION EPIDURAL; INFILTRATION; INTRACAUDAL; PERINEURAL at 11:32:00

## 2019-06-25 RX ADMIN — SODIUM CHLORIDE, SODIUM LACTATE, POTASSIUM CHLORIDE, CALCIUM CHLORIDE: 600; 310; 30; 20 INJECTION, SOLUTION INTRAVENOUS at 11:29:00

## 2019-06-25 NOTE — BRIEF OP NOTE
Pre-Operative Diagnosis: ganglion     Post-Operative Diagnosis: ganglion      Procedure Performed:   Procedure(s):  excision ganglion left wrist    Surgeon(s) and Role:     * Donald Peralta MD - Primary    Assistant(s):        Surgical Findings: Ga

## 2019-06-25 NOTE — INTERVAL H&P NOTE
Pre-op Diagnosis: ganglion    The above referenced H&P was reviewed by Gail Singh MD on 6/25/2019, the patient was examined and no significant changes have occurred in the patient's condition since the H&P was performed.   I discussed with the p

## 2019-06-25 NOTE — ANESTHESIA PROCEDURE NOTES
Airway  Date/Time: 6/25/2019 11:34 AM  Urgency: elective      General Information and Staff    Patient location during procedure: OR  Anesthesiologist: Tiffany Segal MD  Resident/CRNA: Mary Lou Vázquez CRNA  Performed: CRNA     Indications and Laretta Runner

## 2019-06-25 NOTE — OPERATIVE REPORT
[facility]    PATIENT'S NAME: Vandana Barnett   ATTENDING PHYSICIAN: Gail Singh MD   OPERATING PHYSICIAN: Gail Singh MD   PATIENT ACCOUNT#:   [id number]    LOCATION:  [facility] [address] PACU 3 [facility] 10  MEDICAL RECORD #:   [id number] released. Total tourniquet time 23 minutes. The patient tolerated the procedure well and left the operating suite in satisfactory condition.       Dictated By Zenia Farrell MD  d: 06/25/2019 12:21:17  t: 06/25/2019 12:59:47  Job [id number]

## 2019-06-25 NOTE — ANESTHESIA PREPROCEDURE EVALUATION
Anesthesia PreOp Note    HPI:     Lukas Cespedes is a 22year old female who presents for preoperative consultation requested by: Anselmo Vail MD    Date of Surgery: 6/25/2019    Procedure(s):  HAND GANGLION EXCISION  Indication: ganglion    Re Years of education: Not on file      Highest education level: Not on file    Occupational History      Not on file    Social Needs      Financial resource strain: Not on file      Food insecurity:        Worry: Not on file        Inability: Not on file file      Available pre-op labs reviewed. Lab Results   Component Value Date    URINEPREG Negative 06/25/2019             Vital Signs: Body mass index is 25.88 kg/m². height is 1.626 m (5' 4\") and weight is 68.4 kg (150 lb 12.8 oz).  Her oral temperatu

## 2019-06-25 NOTE — ANESTHESIA POSTPROCEDURE EVALUATION
Patient: Michelle Kendalls    Procedure Summary     Date:  06/25/19 Room / Location:  [address] [place] [address] MAIN OR 01 / 300 [facility] MAIN OR    Anesthesia Start:  7687 Anesthesia Stop:  8962    Procedure:  HAND GANGLION EXCISION (Left ) Diagnosis:  (ganglion)    Surgeon:  Lala Estrada

## 2019-06-26 ENCOUNTER — TELEPHONE (OUTPATIENT)
Dept: SURGERY | Facility: CLINIC | Age: 24
End: 2019-06-26

## 2019-06-26 NOTE — TELEPHONE ENCOUNTER
Spoke w/the pt and she stated she's in minimal pain or discomfort, she took her Norco medication which has been effective and elevating her LH. Pt reminded to keep dressing clean and dry and keep arm in sling at all times.   Pt reminded of RN and OT appt

## 2019-06-27 ENCOUNTER — TELEPHONE (OUTPATIENT)
Dept: SURGERY | Facility: CLINIC | Age: 24
End: 2019-06-27

## 2019-06-27 NOTE — TELEPHONE ENCOUNTER
Spoke w/ patient. Pt. told per Dr. Amber Souza pathology results from surgery, that it was benign ganglion cyst.   . Pt. Verbalized understanding. Pt. Instructed to call the office w/ any questions and/or concerns. Dr. Amber Souza notified.

## 2019-06-30 ENCOUNTER — TELEPHONE (OUTPATIENT)
Dept: SURGERY | Facility: CLINIC | Age: 24
End: 2019-06-30

## 2019-06-30 NOTE — TELEPHONE ENCOUNTER
PO 5    Comfortable until today  Feels splint is pressing on digits and she has pain in her knuckles.     Stand, hold hand above head 10 minutes  Repeat    Call if pain not relieved, as splint may need to be adjusted

## 2019-07-01 ENCOUNTER — TELEPHONE (OUTPATIENT)
Dept: SURGERY | Facility: CLINIC | Age: 24
End: 2019-07-01

## 2019-07-01 NOTE — TELEPHONE ENCOUNTER
Pt called to follow up on phone call 6/30/19 with Dr Lam Reese, pt c/o tightness of splint/edema post-op. LVM to please call the office if she wanted her splint adjusted, still had tightness, questions and/or concerns. Dr Lam Reese notified.

## 2019-07-09 ENCOUNTER — OFFICE VISIT (OUTPATIENT)
Dept: SURGERY | Facility: CLINIC | Age: 24
End: 2019-07-09

## 2019-07-09 ENCOUNTER — NURSE ONLY (OUTPATIENT)
Dept: SURGERY | Facility: CLINIC | Age: 24
End: 2019-07-09

## 2019-07-09 DIAGNOSIS — Z48.02 VISIT FOR SUTURE REMOVAL: Primary | ICD-10-CM

## 2019-07-09 DIAGNOSIS — M25.642 STIFFNESS OF JOINT, HAND, LEFT: Primary | ICD-10-CM

## 2019-07-09 DIAGNOSIS — M62.81 DISTAL MUSCLE WEAKNESS: ICD-10-CM

## 2019-07-09 DIAGNOSIS — M67.432 GANGLION OF LEFT WRIST: ICD-10-CM

## 2019-07-09 PROCEDURE — 97166 OT EVAL MOD COMPLEX 45 MIN: CPT | Performed by: OCCUPATIONAL THERAPIST

## 2019-07-09 PROCEDURE — 97110 THERAPEUTIC EXERCISES: CPT | Performed by: OCCUPATIONAL THERAPIST

## 2019-07-09 NOTE — PROGRESS NOTES
L dorsal wrist ganglion  - Date: 19  - Days Since: 14  Pt is in the office today for suture removal L wrist then OT  Pt identified by name and . Orders reviewed. Denies pain, numbness, tingling and need for analgesics.   Arrived with sling on ,

## 2019-07-09 NOTE — PROGRESS NOTES
OCCUPATIONAL THERAPY EVALUATION:   Karyna Barron   [id number]       SUBJECTIVE:    HX of Injury: Can I play baseball. Chief Complaint:   Left wrist tightness. Precautions: No work involving the left hand.   Premorbid Functional Status: Independent w/ this evaluation and agrees to the plan of care. Perez Russell I have reviewed the treatment plan and concur.    Gail Singh MD

## 2019-07-18 ENCOUNTER — OFFICE VISIT (OUTPATIENT)
Dept: SURGERY | Facility: CLINIC | Age: 24
End: 2019-07-18

## 2019-07-18 ENCOUNTER — OFFICE VISIT (OUTPATIENT)
Dept: INTERNAL MEDICINE CLINIC | Facility: CLINIC | Age: 24
End: 2019-07-18

## 2019-07-18 VITALS
WEIGHT: 149 LBS | DIASTOLIC BLOOD PRESSURE: 80 MMHG | HEART RATE: 84 BPM | HEIGHT: 63 IN | TEMPERATURE: 98 F | SYSTOLIC BLOOD PRESSURE: 120 MMHG | BODY MASS INDEX: 26.4 KG/M2

## 2019-07-18 DIAGNOSIS — Z00.00 ROUTINE HEALTH MAINTENANCE: Primary | ICD-10-CM

## 2019-07-18 DIAGNOSIS — M62.81 DISTAL MUSCLE WEAKNESS: ICD-10-CM

## 2019-07-18 DIAGNOSIS — M26.213 MALOCCLUSION, ANGLE'S CLASS III: ICD-10-CM

## 2019-07-18 DIAGNOSIS — M26.29 MALOCCLUSION WITH OPEN BITE: ICD-10-CM

## 2019-07-18 DIAGNOSIS — M25.642 STIFFNESS OF JOINT, HAND, LEFT: Primary | ICD-10-CM

## 2019-07-18 DIAGNOSIS — M67.432 GANGLION OF LEFT WRIST: Primary | ICD-10-CM

## 2019-07-18 PROCEDURE — 99024 POSTOP FOLLOW-UP VISIT: CPT | Performed by: PLASTIC SURGERY

## 2019-07-18 PROCEDURE — 97110 THERAPEUTIC EXERCISES: CPT | Performed by: OCCUPATIONAL THERAPIST

## 2019-07-18 PROCEDURE — 99395 PREV VISIT EST AGE 18-39: CPT | Performed by: INTERNAL MEDICINE

## 2019-07-18 NOTE — PROGRESS NOTES
Surgery 1: L dorsal wrist ganglion  - Date: 06/25/19  - Days Since: 21  \"It feels like it wants to pop\"  Denies pain, numbness,tingling and need for analgesics. Scar healing well without s/s of infection. Doing eucerin massage TID      No complaints.

## 2019-07-18 NOTE — PROGRESS NOTES
Cherie Gaming is a 22year old female. Patient presents with:  Physical      HPI:   Cherie Gaming is a 22year old female who presents for a complete physical exam.    To start senior year of college in [place] this fall.   Studying psych with gil ganglion cyst removal   • WISDOM TEETH REMOVED        Family History   Problem Relation Age of Onset   • Thyroid disease Mother    • Other (diverticulitis) Maternal Grandmother    • Breast Cancer Paternal Grandmother    • Asthma Brother       Social Histor renin, etc). Impacted cerumen, b/l  Easily flushed with warm water; normal TM's. Pt advised against use of Q-tips.     Irene Hampton, 07/18/19, 4:48 PM

## 2019-07-22 ENCOUNTER — LAB ENCOUNTER (OUTPATIENT)
Dept: LAB | Facility: HOSPITAL | Age: 24
End: 2019-07-22
Attending: INTERNAL MEDICINE
Payer: COMMERCIAL

## 2019-07-22 ENCOUNTER — OFFICE VISIT (OUTPATIENT)
Dept: OBGYN CLINIC | Facility: CLINIC | Age: 24
End: 2019-07-22

## 2019-07-22 VITALS
BODY MASS INDEX: 25.9 KG/M2 | WEIGHT: 148 LBS | DIASTOLIC BLOOD PRESSURE: 89 MMHG | SYSTOLIC BLOOD PRESSURE: 128 MMHG | HEIGHT: 63.4 IN | HEART RATE: 96 BPM

## 2019-07-22 DIAGNOSIS — D64.9 ANEMIA, UNSPECIFIED TYPE: ICD-10-CM

## 2019-07-22 DIAGNOSIS — I10 HYPERTENSION, UNSPECIFIED TYPE: ICD-10-CM

## 2019-07-22 DIAGNOSIS — Z12.4 CERVICAL CANCER SCREENING: ICD-10-CM

## 2019-07-22 DIAGNOSIS — Z01.419 WELL WOMAN EXAM WITH ROUTINE GYNECOLOGICAL EXAM: Primary | ICD-10-CM

## 2019-07-22 LAB
ALBUMIN SERPL-MCNC: 3.9 G/DL (ref 3.4–5)
ALBUMIN/GLOB SERPL: 0.9 {RATIO} (ref 1–2)
ALP LIVER SERPL-CCNC: 58 U/L (ref 52–144)
ALT SERPL-CCNC: 15 U/L (ref 13–56)
ANION GAP SERPL CALC-SCNC: 7 MMOL/L (ref 0–18)
AST SERPL-CCNC: 10 U/L (ref 15–37)
BACTERIA UR QL AUTO: NEGATIVE /HPF
BASOPHILS # BLD AUTO: 0.03 X10(3) UL (ref 0–0.2)
BASOPHILS NFR BLD AUTO: 0.6 %
BILIRUB SERPL-MCNC: 0.7 MG/DL (ref 0.1–2)
BILIRUB UR QL: NEGATIVE
BUN BLD-MCNC: 11 MG/DL (ref 7–18)
BUN/CREAT SERPL: 15.5 (ref 10–20)
CALCIUM BLD-MCNC: 8.9 MG/DL (ref 8.5–10.1)
CHLORIDE SERPL-SCNC: 107 MMOL/L (ref 98–112)
CHOLEST SMN-MCNC: 189 MG/DL (ref ?–200)
CLARITY UR: CLEAR
CO2 SERPL-SCNC: 24 MMOL/L (ref 21–32)
COLOR UR: YELLOW
CREAT BLD-MCNC: 0.71 MG/DL (ref 0.55–1.02)
DEPRECATED RDW RBC AUTO: 45.9 FL (ref 35.1–46.3)
EOSINOPHIL # BLD AUTO: 0.05 X10(3) UL (ref 0–0.7)
EOSINOPHIL NFR BLD AUTO: 0.9 %
ERYTHROCYTE [DISTWIDTH] IN BLOOD BY AUTOMATED COUNT: 14.6 % (ref 11–15)
GLOBULIN PLAS-MCNC: 4.3 G/DL (ref 2.8–4.4)
GLUCOSE BLD-MCNC: 82 MG/DL (ref 70–99)
GLUCOSE UR-MCNC: NEGATIVE MG/DL
HCT VFR BLD AUTO: 35.6 % (ref 35–48)
HDLC SERPL-MCNC: 82 MG/DL (ref 40–59)
HGB BLD-MCNC: 11.4 G/DL (ref 12–16)
IMM GRANULOCYTES # BLD AUTO: 0.01 X10(3) UL (ref 0–1)
IMM GRANULOCYTES NFR BLD: 0.2 %
KETONES UR-MCNC: NEGATIVE MG/DL
LDLC SERPL CALC-MCNC: 98 MG/DL (ref ?–100)
LEUKOCYTE ESTERASE UR QL STRIP.AUTO: NEGATIVE
LYMPHOCYTES # BLD AUTO: 2.06 X10(3) UL (ref 1–4)
LYMPHOCYTES NFR BLD AUTO: 38.7 %
M PROTEIN MFR SERPL ELPH: 8.2 G/DL (ref 6.4–8.2)
MCH RBC QN AUTO: 27.7 PG (ref 26–34)
MCHC RBC AUTO-ENTMCNC: 32 G/DL (ref 31–37)
MCV RBC AUTO: 86.6 FL (ref 80–100)
MONOCYTES # BLD AUTO: 0.39 X10(3) UL (ref 0.1–1)
MONOCYTES NFR BLD AUTO: 7.3 %
NEUTROPHILS # BLD AUTO: 2.78 X10 (3) UL (ref 1.5–7.7)
NEUTROPHILS # BLD AUTO: 2.78 X10(3) UL (ref 1.5–7.7)
NEUTROPHILS NFR BLD AUTO: 52.3 %
NITRITE UR QL STRIP.AUTO: NEGATIVE
NONHDLC SERPL-MCNC: 107 MG/DL (ref ?–130)
OSMOLALITY SERPL CALC.SUM OF ELEC: 284 MOSM/KG (ref 275–295)
PATIENT FASTING: YES
PATIENT FASTING: YES
PH UR: 6 [PH] (ref 5–8)
PLATELET # BLD AUTO: 305 10(3)UL (ref 150–450)
POTASSIUM SERPL-SCNC: 3.8 MMOL/L (ref 3.5–5.1)
PROT UR-MCNC: NEGATIVE MG/DL
RBC # BLD AUTO: 4.11 X10(6)UL (ref 3.8–5.3)
RBC #/AREA URNS AUTO: <1 /HPF
SODIUM SERPL-SCNC: 138 MMOL/L (ref 136–145)
SP GR UR STRIP: 1.01 (ref 1–1.03)
TRIGL SERPL-MCNC: 44 MG/DL (ref 30–149)
TSI SER-ACNC: 1.04 MIU/ML (ref 0.36–3.74)
UROBILINOGEN UR STRIP-ACNC: <2
VIT C UR-MCNC: NEGATIVE MG/DL
VLDLC SERPL CALC-MCNC: 9 MG/DL (ref 0–30)
WBC # BLD AUTO: 5.3 X10(3) UL (ref 4–11)
WBC #/AREA URNS AUTO: <1 /HPF

## 2019-07-22 PROCEDURE — 80053 COMPREHEN METABOLIC PANEL: CPT

## 2019-07-22 PROCEDURE — 84466 ASSAY OF TRANSFERRIN: CPT

## 2019-07-22 PROCEDURE — 84443 ASSAY THYROID STIM HORMONE: CPT | Performed by: INTERNAL MEDICINE

## 2019-07-22 PROCEDURE — 82088 ASSAY OF ALDOSTERONE: CPT

## 2019-07-22 PROCEDURE — 82728 ASSAY OF FERRITIN: CPT

## 2019-07-22 PROCEDURE — 99395 PREV VISIT EST AGE 18-39: CPT | Performed by: CLINICAL NURSE SPECIALIST

## 2019-07-22 PROCEDURE — 85025 COMPLETE CBC W/AUTO DIFF WBC: CPT

## 2019-07-22 PROCEDURE — 81001 URINALYSIS AUTO W/SCOPE: CPT | Performed by: INTERNAL MEDICINE

## 2019-07-22 PROCEDURE — 80061 LIPID PANEL: CPT

## 2019-07-22 PROCEDURE — 36415 COLL VENOUS BLD VENIPUNCTURE: CPT | Performed by: INTERNAL MEDICINE

## 2019-07-22 PROCEDURE — 83540 ASSAY OF IRON: CPT

## 2019-07-22 NOTE — PROGRESS NOTES
Monroe Bianchi is a 22year old female [facility] Patient's last menstrual period was 06/30/2019 (exact date). Patient presents with:  Gyn Exam: ANNUAL EXAM  Last annual exam was last year. Has not ever had pap so will do it today. Periods are regular.  Has activity: Not Currently        Comment: last active 2 yrs ago    Lifestyle      Physical activity:        Days per week: Not on file        Minutes per session: Not on file      Stress: Not on file    Relationships      Social connections:        Talks on denies shortness of breath  Gastrointestinal:  denies heartburn, abdominal pain, diarrhea or constipation  Genitourinary:  denies dysuria, incontinence, abnormal vaginal discharge, vaginal itching  Musculoskeletal:  denies back pain.   Skin/Breast:  Denies exams.    Yearly exams encouraged

## 2019-07-23 ENCOUNTER — TELEPHONE (OUTPATIENT)
Dept: INTERNAL MEDICINE CLINIC | Facility: CLINIC | Age: 24
End: 2019-07-23

## 2019-07-23 DIAGNOSIS — R03.0 ELEVATED BLOOD PRESSURE READING: ICD-10-CM

## 2019-07-23 DIAGNOSIS — D64.9 ANEMIA, UNSPECIFIED TYPE: Primary | ICD-10-CM

## 2019-07-23 LAB
DEPRECATED HBV CORE AB SER IA-ACNC: 6.9 NG/ML (ref 12–114)
IRON SATURATION: 11 % (ref 15–50)
IRON SERPL-MCNC: 55 UG/DL (ref 50–170)
TOTAL IRON BINDING CAPACITY: 517 UG/DL (ref 240–450)
TRANSFERRIN SERPL-MCNC: 347 MG/DL (ref 200–360)

## 2019-07-25 LAB — ALDOSTERONE: 13.4 NG/DL

## 2019-07-26 NOTE — TELEPHONE ENCOUNTER
Please call with labs -- all normal except mild anemia and significant iron deficiency. Does she have heavy periods? I would like her to start taking OTC Slow Fe 160mg BID and repeat labs in 3 months. I'll order.   Please update med module

## 2019-07-26 NOTE — TELEPHONE ENCOUNTER
I spoke with patient and relayed Dr. Eduard Tovar message. She verbalized understanding. She says she doesn't think her periods are heavy but says on day 3 and 4 of her cycle she will use between 10-12 pads. Added historical med to list.  To Dr. Mirna Burr.

## 2019-08-23 ENCOUNTER — TELEPHONE (OUTPATIENT)
Dept: INTERNAL MEDICINE CLINIC | Facility: CLINIC | Age: 24
End: 2019-08-23

## 2019-08-23 ENCOUNTER — PATIENT MESSAGE (OUTPATIENT)
Dept: INTERNAL MEDICINE CLINIC | Facility: CLINIC | Age: 24
End: 2019-08-23

## 2019-08-23 NOTE — TELEPHONE ENCOUNTER
----- Message from [place] A. Rosalie Schwab sent at 8/23/2019  1:32 PM CDT -----  Regarding: Prescription Question  Contact: [phone]  I’m having trouble finding the pills that you requested me to take for my iron (Ferrous Sulfate Dried ER (SLOW RELEASE IRON)

## 2019-08-23 NOTE — TELEPHONE ENCOUNTER
Spoke with patient who reports that she has not taken any iron supplementation OTC for the last month. She has been trying to find the correct dosage but cannot find it anywhere. She would like to use [facility] in [place].    MD note from 7/23:  Virginia Hernandez

## 2019-08-23 NOTE — TELEPHONE ENCOUNTER
LMTCB to see if patient has been taking any iron supplement over the past month and to verify the pharmacy she would like it sent to. Prescription pended.

## 2019-08-23 NOTE — TELEPHONE ENCOUNTER
From: Sravan Munroe  To:  Duane Beer, MD  Sent: 8/23/2019 1:32 PM CDT  Subject: Prescription Question    I’m having trouble finding the pills that you requested me to take for my iron (Ferrous Sulfate Dried ER (SLOW RELEASE IRON) 160 (50 Fe) MG Ora

## 2019-08-23 NOTE — TELEPHONE ENCOUNTER
Telephone encounter created. LMTCB for patient to see if she has been taking any OTC iron for the past month and to see what pharmacy she would like prescription sent to.

## 2019-12-03 ENCOUNTER — PATIENT MESSAGE (OUTPATIENT)
Dept: INTERNAL MEDICINE CLINIC | Facility: CLINIC | Age: 24
End: 2019-12-03

## 2019-12-03 DIAGNOSIS — M26.29 OTHER ANOMALIES OF DENTAL ARCH RELATIONSHIP: ICD-10-CM

## 2019-12-03 DIAGNOSIS — M26.213 MALOCCLUSION, ANGLE'S CLASS III: Primary | ICD-10-CM

## 2019-12-03 NOTE — TELEPHONE ENCOUNTER
From: Debroah Mohs  To: Jacolyn Habermann, MD  Sent: 12/3/2019 1:55 PM CST  Subject: Referral Request    Hi Dr. Eva Arnold,    So far Bita Malik been able to see the oral surgeon at [place] twice now from the referral. The referral has now  and I’m in need

## 2019-12-18 NOTE — TELEPHONE ENCOUNTER
PT is calling to check on the status of her referral, she is scheduled on 1/10  She had to reschedule another melina because the referral wasn't complete  Please call pt with update [phone]  I tried calling [facility] it went to   Tasked to managed c

## 2019-12-23 ENCOUNTER — PATIENT MESSAGE (OUTPATIENT)
Dept: INTERNAL MEDICINE CLINIC | Facility: CLINIC | Age: 24
End: 2019-12-23

## 2019-12-23 NOTE — TELEPHONE ENCOUNTER
From: Rajan Dodd  To: Nabeel Mccollum MD  Sent: 12/23/2019 12:46 PM CST  Subject: Referral Request    I wanted to also check on the status of the referral to the oral surgeon at [facility].  I know my mom spoke with your office recently and said that jono

## 2020-01-02 ENCOUNTER — OFFICE VISIT (OUTPATIENT)
Dept: INTERNAL MEDICINE CLINIC | Facility: CLINIC | Age: 25
End: 2020-01-02

## 2020-01-02 VITALS
DIASTOLIC BLOOD PRESSURE: 86 MMHG | HEART RATE: 89 BPM | TEMPERATURE: 98 F | BODY MASS INDEX: 26.27 KG/M2 | OXYGEN SATURATION: 99 % | WEIGHT: 150.13 LBS | SYSTOLIC BLOOD PRESSURE: 130 MMHG | HEIGHT: 63.5 IN

## 2020-01-02 DIAGNOSIS — R03.0 ELEVATED BLOOD PRESSURE READING: ICD-10-CM

## 2020-01-02 DIAGNOSIS — R59.0 ENLARGED SUBMENTAL LYMPH NODE: Primary | ICD-10-CM

## 2020-01-02 PROCEDURE — 90686 IIV4 VACC NO PRSV 0.5 ML IM: CPT | Performed by: INTERNAL MEDICINE

## 2020-01-02 PROCEDURE — 90471 IMMUNIZATION ADMIN: CPT | Performed by: INTERNAL MEDICINE

## 2020-01-02 PROCEDURE — 99213 OFFICE O/P EST LOW 20 MIN: CPT | Performed by: INTERNAL MEDICINE

## 2020-01-02 NOTE — PROGRESS NOTES
Monroe Bianchi is a 22year old female. Patient presents with:  Swollen Glands: Feels as though lymph nodes in neck are swollen the last few weeks. Since traveling home from school in [place] via plane, she notices it more under jaw line.  Also with c/o s murmur or gallop      ASSESSMENT AND PLAN:     Non-enlarged LN's  Per pt, previously enlarged. Likely due to URI-type sx. Rec flonase. Elevated blood pressure  Repeat /92. Pt will check at home BID x 1 week and call/email with readings.   Darshan Hyde

## 2020-01-03 NOTE — TELEPHONE ENCOUNTER
1 to 1 taken over  Pt appears to be asleep  No signs of distress    Will continue to monitor     Florencia Rand  12/08/17 9491 Per Clinical request below,     Called Triage office Dr. Ruiz Seen,.  Need clinicals    Left message with Triage office Dr. Ruiz Seen, looked in epic, not available    Thanks,    [facility]

## 2020-01-03 NOTE — TELEPHONE ENCOUNTER
Message from [address] [place] [address] below noted. Update sent to patient in My Chart regarding their request for clinical information.

## 2020-05-11 ENCOUNTER — PATIENT MESSAGE (OUTPATIENT)
Dept: INTERNAL MEDICINE CLINIC | Facility: CLINIC | Age: 25
End: 2020-05-11

## 2020-05-11 DIAGNOSIS — M26.29 MALOCCLUSION WITH OPEN BITE: Primary | ICD-10-CM

## 2020-05-11 DIAGNOSIS — M26.213 MALOCCLUSION, ANGLE'S CLASS III: ICD-10-CM

## 2020-05-11 NOTE — TELEPHONE ENCOUNTER
From: Micheline aDng  To: Hannah Gruber MD  Sent: 5/11/2020 3:09 PM CDT  Subject: Referral Trino Montero,    Since the outbreak of the Covid virus, I was not able to attend my oral surgery appointments.  They have been having to resched

## 2020-05-14 NOTE — TELEPHONE ENCOUNTER
To Dr. Hannah Paulino----    Mychart from patient:  \"I can’t seem to catch a break at all. Everything was starting to progress, but the spread of the virus put a halt to a lot of my appointments which caused the referral to .  The oral surgeon is Leighton Arenas

## 2020-09-09 ENCOUNTER — OFFICE VISIT (OUTPATIENT)
Dept: INTERNAL MEDICINE CLINIC | Facility: CLINIC | Age: 25
End: 2020-09-09

## 2020-09-09 ENCOUNTER — OFFICE VISIT (OUTPATIENT)
Dept: OBGYN CLINIC | Facility: CLINIC | Age: 25
End: 2020-09-09

## 2020-09-09 VITALS
SYSTOLIC BLOOD PRESSURE: 112 MMHG | DIASTOLIC BLOOD PRESSURE: 80 MMHG | OXYGEN SATURATION: 99 % | TEMPERATURE: 98 F | HEIGHT: 63.5 IN | RESPIRATION RATE: 16 BRPM | BODY MASS INDEX: 27.47 KG/M2 | HEART RATE: 88 BPM | WEIGHT: 157 LBS

## 2020-09-09 VITALS
DIASTOLIC BLOOD PRESSURE: 82 MMHG | SYSTOLIC BLOOD PRESSURE: 129 MMHG | BODY MASS INDEX: 27 KG/M2 | WEIGHT: 157.38 LBS | HEART RATE: 98 BPM

## 2020-09-09 DIAGNOSIS — E55.9 VITAMIN D DEFICIENCY: ICD-10-CM

## 2020-09-09 DIAGNOSIS — D64.9 ANEMIA, UNSPECIFIED TYPE: ICD-10-CM

## 2020-09-09 DIAGNOSIS — M26.29 MALOCCLUSION WITH OPEN BITE: ICD-10-CM

## 2020-09-09 DIAGNOSIS — Z00.00 ROUTINE HEALTH MAINTENANCE: Primary | ICD-10-CM

## 2020-09-09 DIAGNOSIS — Z01.419 WELL WOMAN EXAM WITH ROUTINE GYNECOLOGICAL EXAM: Primary | ICD-10-CM

## 2020-09-09 DIAGNOSIS — M26.213 MALOCCLUSION, ANGLE'S CLASS III: ICD-10-CM

## 2020-09-09 DIAGNOSIS — R53.83 OTHER FATIGUE: ICD-10-CM

## 2020-09-09 PROCEDURE — 3074F SYST BP LT 130 MM HG: CPT | Performed by: INTERNAL MEDICINE

## 2020-09-09 PROCEDURE — 99395 PREV VISIT EST AGE 18-39: CPT | Performed by: CLINICAL NURSE SPECIALIST

## 2020-09-09 PROCEDURE — 99395 PREV VISIT EST AGE 18-39: CPT | Performed by: INTERNAL MEDICINE

## 2020-09-09 PROCEDURE — 90471 IMMUNIZATION ADMIN: CPT | Performed by: INTERNAL MEDICINE

## 2020-09-09 PROCEDURE — 3008F BODY MASS INDEX DOCD: CPT | Performed by: INTERNAL MEDICINE

## 2020-09-09 PROCEDURE — 3079F DIAST BP 80-89 MM HG: CPT | Performed by: CLINICAL NURSE SPECIALIST

## 2020-09-09 PROCEDURE — 90651 9VHPV VACCINE 2/3 DOSE IM: CPT | Performed by: INTERNAL MEDICINE

## 2020-09-09 PROCEDURE — 3074F SYST BP LT 130 MM HG: CPT | Performed by: CLINICAL NURSE SPECIALIST

## 2020-09-09 PROCEDURE — 3079F DIAST BP 80-89 MM HG: CPT | Performed by: INTERNAL MEDICINE

## 2020-09-09 NOTE — PROGRESS NOTES
Valeria Mark is a 26year old female. Patient presents with: Annual: Presents for annual PE. Paps with gyne. Feels well overall. Allergies: For the past few weeks has had dry eyes, itchy throat, sneezing. Has never had seazonal allergies before.    Ref Past Surgical History:   Procedure Laterality Date   • EXCIS PRIMARY GANGLION WRIST Left 06/25/2019    Excision ganglion of R dorsal wrist    • HAND GANGLION EXCISION Left 6/25/2019    Performed by Anselmo Vail MD at [facility] OR   • OTHER LICONA for Paps. Encouraged exercise. Gardasil vaccine #1/3 today 9/9/20. Rec flu vaccine. Jaw deformity (underbite)  Followed by  Dr. Jesika Babcock at [facility]; surgery delayed due to Covid. Has upcoming appt. Iron deficiency anemia  Likely 2/2 heavy menses.   Mumtaz Bateman

## 2020-09-10 NOTE — PROGRESS NOTES
Debroah Mohs is a 26year old female [facility] Patient's last menstrual period was 08/23/2020. Patient presents with:  Gyn Exam: ANNUAL EXAM   Last annual exam and pap was last year. Pap was normal. Periods are regular.  She is not currently sexually acti file    Relationships      Social connections:        Talks on phone: Not on file        Gets together: Not on file        Attends Shinto service: Not on file        Active member of club or organization: Not on file        Attends meetings of clubs or constipation  Genitourinary:  denies dysuria, incontinence, abnormal vaginal discharge, vaginal itching  Musculoskeletal:  denies back pain. Skin/Breast:  Denies any breast pain, lumps, or discharge.    Neurological:  denies headaches, extremity weakness o exams.    Yearly exams encouraged

## 2020-09-12 ENCOUNTER — LAB ENCOUNTER (OUTPATIENT)
Dept: LAB | Facility: HOSPITAL | Age: 25
End: 2020-09-12
Attending: INTERNAL MEDICINE
Payer: COMMERCIAL

## 2020-09-12 DIAGNOSIS — R53.83 OTHER FATIGUE: ICD-10-CM

## 2020-09-12 DIAGNOSIS — E55.9 VITAMIN D DEFICIENCY: ICD-10-CM

## 2020-09-12 DIAGNOSIS — Z00.00 ROUTINE HEALTH MAINTENANCE: ICD-10-CM

## 2020-09-12 DIAGNOSIS — D64.9 ANEMIA, UNSPECIFIED TYPE: ICD-10-CM

## 2020-09-12 LAB
ALBUMIN SERPL-MCNC: 4 G/DL (ref 3.4–5)
ALBUMIN/GLOB SERPL: 1.1 {RATIO} (ref 1–2)
ALP LIVER SERPL-CCNC: 61 U/L (ref 37–98)
ALT SERPL-CCNC: 18 U/L (ref 13–56)
ANION GAP SERPL CALC-SCNC: 6 MMOL/L (ref 0–18)
AST SERPL-CCNC: 8 U/L (ref 15–37)
BASOPHILS # BLD AUTO: 0.02 X10(3) UL (ref 0–0.2)
BASOPHILS NFR BLD AUTO: 0.4 %
BILIRUB SERPL-MCNC: 0.6 MG/DL (ref 0.1–2)
BUN BLD-MCNC: 12 MG/DL (ref 7–18)
BUN/CREAT SERPL: 14.6 (ref 10–20)
CALCIUM BLD-MCNC: 9 MG/DL (ref 8.5–10.1)
CHLORIDE SERPL-SCNC: 105 MMOL/L (ref 98–112)
CHOLEST SMN-MCNC: 181 MG/DL (ref ?–200)
CO2 SERPL-SCNC: 29 MMOL/L (ref 21–32)
CREAT BLD-MCNC: 0.82 MG/DL (ref 0.55–1.02)
DEPRECATED HBV CORE AB SER IA-ACNC: 14.4 NG/ML (ref 12–114)
DEPRECATED RDW RBC AUTO: 41 FL (ref 35.1–46.3)
EOSINOPHIL # BLD AUTO: 0.09 X10(3) UL (ref 0–0.7)
EOSINOPHIL NFR BLD AUTO: 1.7 %
ERYTHROCYTE [DISTWIDTH] IN BLOOD BY AUTOMATED COUNT: 12.8 % (ref 11–15)
GLOBULIN PLAS-MCNC: 3.8 G/DL (ref 2.8–4.4)
GLUCOSE BLD-MCNC: 84 MG/DL (ref 70–99)
HCT VFR BLD AUTO: 35.6 % (ref 35–48)
HDLC SERPL-MCNC: 83 MG/DL (ref 40–59)
HGB BLD-MCNC: 11.8 G/DL (ref 12–16)
IMM GRANULOCYTES # BLD AUTO: 0.01 X10(3) UL (ref 0–1)
IMM GRANULOCYTES NFR BLD: 0.2 %
IRON SATURATION: 10 % (ref 15–50)
IRON SERPL-MCNC: 47 UG/DL (ref 50–170)
LDLC SERPL CALC-MCNC: 91 MG/DL (ref ?–100)
LYMPHOCYTES # BLD AUTO: 2.01 X10(3) UL (ref 1–4)
LYMPHOCYTES NFR BLD AUTO: 36.9 %
M PROTEIN MFR SERPL ELPH: 7.8 G/DL (ref 6.4–8.2)
MCH RBC QN AUTO: 29.1 PG (ref 26–34)
MCHC RBC AUTO-ENTMCNC: 33.1 G/DL (ref 31–37)
MCV RBC AUTO: 87.9 FL (ref 80–100)
MONOCYTES # BLD AUTO: 0.39 X10(3) UL (ref 0.1–1)
MONOCYTES NFR BLD AUTO: 7.2 %
NEUTROPHILS # BLD AUTO: 2.92 X10 (3) UL (ref 1.5–7.7)
NEUTROPHILS # BLD AUTO: 2.92 X10(3) UL (ref 1.5–7.7)
NEUTROPHILS NFR BLD AUTO: 53.6 %
NONHDLC SERPL-MCNC: 98 MG/DL (ref ?–130)
OSMOLALITY SERPL CALC.SUM OF ELEC: 289 MOSM/KG (ref 275–295)
PATIENT FASTING Y/N/NP: YES
PATIENT FASTING Y/N/NP: YES
PLATELET # BLD AUTO: 257 10(3)UL (ref 150–450)
POTASSIUM SERPL-SCNC: 3.8 MMOL/L (ref 3.5–5.1)
RBC # BLD AUTO: 4.05 X10(6)UL (ref 3.8–5.3)
SODIUM SERPL-SCNC: 140 MMOL/L (ref 136–145)
TOTAL IRON BINDING CAPACITY: 448 UG/DL (ref 240–450)
TRANSFERRIN SERPL-MCNC: 301 MG/DL (ref 200–360)
TRIGL SERPL-MCNC: 33 MG/DL (ref 30–149)
TSI SER-ACNC: 1.46 MIU/ML (ref 0.36–3.74)
VLDLC SERPL CALC-MCNC: 7 MG/DL (ref 0–30)
WBC # BLD AUTO: 5.4 X10(3) UL (ref 4–11)

## 2020-09-12 PROCEDURE — 84466 ASSAY OF TRANSFERRIN: CPT

## 2020-09-12 PROCEDURE — 83540 ASSAY OF IRON: CPT

## 2020-09-12 PROCEDURE — 82306 VITAMIN D 25 HYDROXY: CPT

## 2020-09-12 PROCEDURE — 80061 LIPID PANEL: CPT

## 2020-09-12 PROCEDURE — 85025 COMPLETE CBC W/AUTO DIFF WBC: CPT

## 2020-09-12 PROCEDURE — 80053 COMPREHEN METABOLIC PANEL: CPT

## 2020-09-12 PROCEDURE — 82728 ASSAY OF FERRITIN: CPT

## 2020-09-12 PROCEDURE — 36415 COLL VENOUS BLD VENIPUNCTURE: CPT

## 2020-09-12 PROCEDURE — 84443 ASSAY THYROID STIM HORMONE: CPT | Performed by: INTERNAL MEDICINE

## 2020-09-14 LAB — 25(OH)D3 SERPL-MCNC: 16.5 NG/ML (ref 30–100)

## 2020-09-22 PROBLEM — D50.0 IRON DEFICIENCY ANEMIA DUE TO CHRONIC BLOOD LOSS: Status: ACTIVE | Noted: 2020-09-22

## 2020-09-22 RX ORDER — CHOLECALCIFEROL (VITAMIN D3) 50 MCG
4000 TABLET ORAL DAILY
Qty: 1 TABLET | Refills: 0 | COMMUNITY
Start: 2020-09-22 | End: 2021-06-07 | Stop reason: ALTCHOICE

## 2020-09-22 RX ORDER — FERROUS SULFATE 325(65) MG
325 TABLET ORAL
Qty: 1 TABLET | Refills: 0 | COMMUNITY
Start: 2020-09-22 | End: 2021-06-07 | Stop reason: ALTCHOICE

## 2020-09-30 ENCOUNTER — MED REC SCAN ONLY (OUTPATIENT)
Dept: INTERNAL MEDICINE CLINIC | Facility: CLINIC | Age: 25
End: 2020-09-30

## 2020-10-26 ENCOUNTER — TELEPHONE (OUTPATIENT)
Dept: INTERNAL MEDICINE CLINIC | Facility: CLINIC | Age: 25
End: 2020-10-26

## 2020-10-26 NOTE — TELEPHONE ENCOUNTER
Spoke to patient regarding another matter. She had questions about her referrals. She states she will be having another pre op appt with Dr Kamila Hung in december and then after her surgery she will be having post op appts as well    Patient asking if there are any remaining visits available for Dr Kamila Hung    To Robe Loyola can you please review current referrals help with this question?   Call back to [phone]

## 2020-10-27 NOTE — TELEPHONE ENCOUNTER
To Managed Care - see below. Please review referrals, etc and let us know what is needed for pt. See also 10/22/20 message.

## 2021-02-26 ENCOUNTER — PATIENT MESSAGE (OUTPATIENT)
Dept: INTERNAL MEDICINE CLINIC | Facility: CLINIC | Age: 26
End: 2021-02-26

## 2021-02-26 DIAGNOSIS — M26.29 MALOCCLUSION WITH OPEN BITE: ICD-10-CM

## 2021-02-26 DIAGNOSIS — M26.213 MALOCCLUSION, ANGLE'S CLASS III: Primary | ICD-10-CM

## 2021-02-26 NOTE — TELEPHONE ENCOUNTER
From: Gurinder Shelton  To: Joe Will MD  Sent: 2/26/2021 2:57 PM CST  Subject: Referral Request    Hi Dr. Mary Zarate,    I just wanted to request another referral for a few more post-op appointments at [place].  They said it'll be better just to have a

## 2021-05-05 ENCOUNTER — OFFICE VISIT (OUTPATIENT)
Dept: INTERNAL MEDICINE CLINIC | Facility: CLINIC | Age: 26
End: 2021-05-05

## 2021-05-05 VITALS
HEIGHT: 63.5 IN | HEART RATE: 97 BPM | BODY MASS INDEX: 24.85 KG/M2 | DIASTOLIC BLOOD PRESSURE: 74 MMHG | SYSTOLIC BLOOD PRESSURE: 122 MMHG | WEIGHT: 142 LBS | TEMPERATURE: 99 F | OXYGEN SATURATION: 99 %

## 2021-05-05 DIAGNOSIS — H52.203 MYOPIA OF BOTH EYES WITH ASTIGMATISM: Primary | ICD-10-CM

## 2021-05-05 DIAGNOSIS — H52.13 MYOPIA OF BOTH EYES WITH ASTIGMATISM: Primary | ICD-10-CM

## 2021-05-05 PROCEDURE — 3074F SYST BP LT 130 MM HG: CPT | Performed by: INTERNAL MEDICINE

## 2021-05-05 PROCEDURE — 3008F BODY MASS INDEX DOCD: CPT | Performed by: INTERNAL MEDICINE

## 2021-05-05 PROCEDURE — 3078F DIAST BP <80 MM HG: CPT | Performed by: INTERNAL MEDICINE

## 2021-05-05 PROCEDURE — 99212 OFFICE O/P EST SF 10 MIN: CPT | Performed by: INTERNAL MEDICINE

## 2021-05-05 NOTE — PROGRESS NOTES
Monroe Bianchi is a 23year old female. Patient presents with:  Checkup: Would like to get lasik for a job, wants to discuss. 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   Wa distress      ASSESSMENT AND PLAN:     1. Myopia of both eyes with astigmatism  Pt referred to ophtho Dr. Savannah Hou for [facility] surgery evaluation, as corrective surgery is a requirement for her employment. No contraindications for surgery.         The

## 2021-05-17 ENCOUNTER — PATIENT MESSAGE (OUTPATIENT)
Dept: INTERNAL MEDICINE CLINIC | Facility: CLINIC | Age: 26
End: 2021-05-17

## 2021-05-17 DIAGNOSIS — M67.432 GANGLION CYST OF DORSUM OF LEFT WRIST: Primary | ICD-10-CM

## 2021-05-18 NOTE — TELEPHONE ENCOUNTER
From: Zach Lui  To: Meka Conde MD  Sent: 5/17/2021 7:42 PM CDT  Subject: Non-Urgent Wil Rodrigues Ace,    Recently I've noticed that I probably have yet another ganglion cyst on the hand that I had surgery on.  It's a pretty

## 2021-05-20 ENCOUNTER — IMMUNIZATION (OUTPATIENT)
Dept: LAB | Facility: HOSPITAL | Age: 26
End: 2021-05-20
Attending: EMERGENCY MEDICINE
Payer: COMMERCIAL

## 2021-05-20 DIAGNOSIS — Z23 NEED FOR VACCINATION: Primary | ICD-10-CM

## 2021-05-20 PROCEDURE — 0001A SARSCOV2 VAC 30MCG/0.3ML IM: CPT

## 2021-06-07 ENCOUNTER — OFFICE VISIT (OUTPATIENT)
Dept: SURGERY | Facility: CLINIC | Age: 26
End: 2021-06-07

## 2021-06-07 DIAGNOSIS — M67.432 GANGLION OF LEFT WRIST: Primary | ICD-10-CM

## 2021-06-07 PROCEDURE — 99243 OFF/OP CNSLTJ NEW/EST LOW 30: CPT | Performed by: PLASTIC SURGERY

## 2021-06-10 ENCOUNTER — IMMUNIZATION (OUTPATIENT)
Dept: LAB | Facility: HOSPITAL | Age: 26
End: 2021-06-10
Attending: EMERGENCY MEDICINE
Payer: COMMERCIAL

## 2021-06-10 DIAGNOSIS — Z23 NEED FOR VACCINATION: Primary | ICD-10-CM

## 2021-06-10 PROCEDURE — 0002A SARSCOV2 VAC 30MCG/0.3ML IM: CPT

## 2021-06-30 ENCOUNTER — TELEPHONE (OUTPATIENT)
Dept: INTERNAL MEDICINE CLINIC | Facility: CLINIC | Age: 26
End: 2021-06-30

## 2021-06-30 NOTE — TELEPHONE ENCOUNTER
Pt requesting order for TB test  Please call pt when order in place    Pt also requesting copy of immunization record    Tasked to nursing

## 2021-07-01 NOTE — TELEPHONE ENCOUNTER
Patient called and asked what TB test was for. Pt states she needs TB test for new job. Inquired if she needed a 2 step TB skin test or a TB blood test would do. Pt will call back after she asks if 1 or 2 step TB test is needed or a TB lab test is okay.

## 2021-07-07 ENCOUNTER — TELEPHONE (OUTPATIENT)
Dept: INTERNAL MEDICINE CLINIC | Facility: CLINIC | Age: 26
End: 2021-07-07

## 2021-07-07 NOTE — TELEPHONE ENCOUNTER
Please call pt  She was hoping to  a copy of her immunization record today if possible  Please call pt to advise  Tasked to nursing

## 2021-07-09 NOTE — TELEPHONE ENCOUNTER
TB Testing   7/9/2021 3:46 PM     To: Judith Delatorre      From: Nicole Cavazos CMA      Created: 7/9/2021 3:46 PM        John Grimes -      Just checking in. .. Were you able to find out from your employer which TB test was sufficient?  Let us know if we s

## 2021-07-28 ENCOUNTER — TELEPHONE (OUTPATIENT)
Dept: INTERNAL MEDICINE CLINIC | Facility: CLINIC | Age: 26
End: 2021-07-28

## 2021-07-28 NOTE — TELEPHONE ENCOUNTER
Left message to call back. With DR. Santos - need fax number - referral with fax cover sheet in Triage room     Under media faxed to [phone] - referral faxed to that number -confirmation recieved

## 2021-07-28 NOTE — TELEPHONE ENCOUNTER
Ly from Dr. Norman Yang office is looking for a referral request that was faxed to Dr Cody White on 7/21/21. Patient is being seen by Dr Norman Yang tomorrow 7/29/21.       Please call and advise

## 2021-09-03 NOTE — PROGRESS NOTES
No additional comment Pt request for surgery signed by pt and witnessed and signed by RN. Prescription for norco and narcotic hand-out instruction sheet given to and reviewed w/patient.   Pre-Surgical Instruction Handout, Hand Elevation Handout, Dressing Protector Handout, and

## 2021-10-05 ENCOUNTER — OFFICE VISIT (OUTPATIENT)
Dept: INTERNAL MEDICINE CLINIC | Facility: CLINIC | Age: 26
End: 2021-10-05

## 2021-10-05 ENCOUNTER — LAB ENCOUNTER (OUTPATIENT)
Dept: LAB | Age: 26
End: 2021-10-05
Attending: INTERNAL MEDICINE
Payer: COMMERCIAL

## 2021-10-05 VITALS
DIASTOLIC BLOOD PRESSURE: 82 MMHG | HEIGHT: 63.5 IN | SYSTOLIC BLOOD PRESSURE: 122 MMHG | BODY MASS INDEX: 24.85 KG/M2 | TEMPERATURE: 99 F | OXYGEN SATURATION: 100 % | HEART RATE: 85 BPM | WEIGHT: 142 LBS

## 2021-10-05 DIAGNOSIS — Z00.00 ROUTINE HEALTH MAINTENANCE: ICD-10-CM

## 2021-10-05 DIAGNOSIS — E55.9 VITAMIN D DEFICIENCY: Primary | ICD-10-CM

## 2021-10-05 DIAGNOSIS — D50.0 IRON DEFICIENCY ANEMIA DUE TO CHRONIC BLOOD LOSS: ICD-10-CM

## 2021-10-05 DIAGNOSIS — E55.9 VITAMIN D DEFICIENCY: ICD-10-CM

## 2021-10-05 PROCEDURE — 82306 VITAMIN D 25 HYDROXY: CPT

## 2021-10-05 PROCEDURE — 90686 IIV4 VACC NO PRSV 0.5 ML IM: CPT | Performed by: INTERNAL MEDICINE

## 2021-10-05 PROCEDURE — 84466 ASSAY OF TRANSFERRIN: CPT

## 2021-10-05 PROCEDURE — 80053 COMPREHEN METABOLIC PANEL: CPT

## 2021-10-05 PROCEDURE — 90651 9VHPV VACCINE 2/3 DOSE IM: CPT | Performed by: INTERNAL MEDICINE

## 2021-10-05 PROCEDURE — 90471 IMMUNIZATION ADMIN: CPT | Performed by: INTERNAL MEDICINE

## 2021-10-05 PROCEDURE — 84443 ASSAY THYROID STIM HORMONE: CPT | Performed by: INTERNAL MEDICINE

## 2021-10-05 PROCEDURE — 3074F SYST BP LT 130 MM HG: CPT | Performed by: INTERNAL MEDICINE

## 2021-10-05 PROCEDURE — 82728 ASSAY OF FERRITIN: CPT

## 2021-10-05 PROCEDURE — 3008F BODY MASS INDEX DOCD: CPT | Performed by: INTERNAL MEDICINE

## 2021-10-05 PROCEDURE — 85025 COMPLETE CBC W/AUTO DIFF WBC: CPT

## 2021-10-05 PROCEDURE — 36415 COLL VENOUS BLD VENIPUNCTURE: CPT

## 2021-10-05 PROCEDURE — 83540 ASSAY OF IRON: CPT

## 2021-10-05 PROCEDURE — 90472 IMMUNIZATION ADMIN EACH ADD: CPT | Performed by: INTERNAL MEDICINE

## 2021-10-05 PROCEDURE — 3079F DIAST BP 80-89 MM HG: CPT | Performed by: INTERNAL MEDICINE

## 2021-10-05 PROCEDURE — 99395 PREV VISIT EST AGE 18-39: CPT | Performed by: INTERNAL MEDICINE

## 2021-10-05 NOTE — PROGRESS NOTES
Oswaldo Meeks is a 23year old female. Patient presents with:  Physical: see's gyne for pap.        HPI:   Oswaldo Meeks is a 23year old female who presents for a complete physical exam.      Saw Dr. Sonya Barrera re Lasik surgery -- was referred to a ret Left 06/25/2019    Excision ganglion of R dorsal wrist    • JAW SURGERY  12/09/2020    double jaw surgery   • OTHER SURGICAL HISTORY Right around 2015    ganglion cyst removal   • OTHER SURGICAL HISTORY  12/2020    Jaw surgery   • WISDOM TEETH REMOVED health maintenance  Check fasting labs (lipids normal in 9/2020). Sees gyne for Paps. Encouraged exercise. Gardasil vaccine #1/3 done 9/9/20. Will give #2 today and #3 in 4 months. Had Robe Andrade covid vaccines. Td in 2018. Flu shot today 10/6/21.     J

## 2021-10-05 NOTE — PROGRESS NOTES
Patient verified that she has no allergy to yeast. Gardasil dose # 2 administered to RT deltoid, tolerated well. Patient was observed for 15 minutes post injection. No adverse reactions noted. Patient reminded to return to clinic in 4 months for dose #3.

## 2021-10-07 ENCOUNTER — TELEPHONE (OUTPATIENT)
Dept: INTERNAL MEDICINE CLINIC | Facility: CLINIC | Age: 26
End: 2021-10-07

## 2021-10-07 DIAGNOSIS — D50.0 IRON DEFICIENCY ANEMIA DUE TO CHRONIC BLOOD LOSS: ICD-10-CM

## 2021-10-07 DIAGNOSIS — E55.9 VITAMIN D DEFICIENCY: Primary | ICD-10-CM

## 2021-10-08 NOTE — TELEPHONE ENCOUNTER
Please call pt with labs. Her anemia is worse and her iron levels are very low. Please ask her to restart the iron (ferrous sulfate) 325mg every day. Also Vitamin D level is very low. I'd like to start her on the higher dose weekly vitamin D (order pended).     Let's get repeat labs in about 3 months (ordered)

## 2021-10-11 RX ORDER — ERGOCALCIFEROL 1.25 MG/1
50000 CAPSULE ORAL WEEKLY
Qty: 13 CAPSULE | Refills: 3 | Status: SHIPPED | OUTPATIENT
Start: 2021-10-11 | End: 2022-10-11

## 2021-10-11 NOTE — TELEPHONE ENCOUNTER
Spoke to patient and relayed MD message and instructions, patient verbalizes understanding and agrees with plan. She will get iron OTC. ERx'd vitamin D as pended to pharmacy. FYI to Dr. Zander Crum-- patient has noted increase in days that her menstrual cycle is heavier.

## 2022-01-14 ENCOUNTER — IMMUNIZATION (OUTPATIENT)
Dept: LAB | Facility: HOSPITAL | Age: 27
End: 2022-01-14
Attending: EMERGENCY MEDICINE
Payer: COMMERCIAL

## 2022-01-14 DIAGNOSIS — D50.0 IRON DEFICIENCY ANEMIA DUE TO CHRONIC BLOOD LOSS: ICD-10-CM

## 2022-01-14 DIAGNOSIS — Z23 NEED FOR VACCINATION: Primary | ICD-10-CM

## 2022-01-14 DIAGNOSIS — E55.9 VITAMIN D DEFICIENCY: ICD-10-CM

## 2022-01-14 LAB
BASOPHILS # BLD AUTO: 0.03 X10(3) UL (ref 0–0.2)
BASOPHILS NFR BLD AUTO: 0.7 %
DEPRECATED HBV CORE AB SER IA-ACNC: 16.4 NG/ML
DEPRECATED RDW RBC AUTO: 45.6 FL (ref 35.1–46.3)
EOSINOPHIL # BLD AUTO: 0.08 X10(3) UL (ref 0–0.7)
EOSINOPHIL NFR BLD AUTO: 1.8 %
ERYTHROCYTE [DISTWIDTH] IN BLOOD BY AUTOMATED COUNT: 13.6 % (ref 11–15)
HCT VFR BLD AUTO: 36.2 %
HGB BLD-MCNC: 11.7 G/DL
IMM GRANULOCYTES # BLD AUTO: 0 X10(3) UL (ref 0–1)
IMM GRANULOCYTES NFR BLD: 0 %
IRON SATN MFR SERPL: 11 %
IRON SERPL-MCNC: 43 UG/DL
LYMPHOCYTES # BLD AUTO: 1.98 X10(3) UL (ref 1–4)
LYMPHOCYTES NFR BLD AUTO: 45.5 %
MCH RBC QN AUTO: 29.2 PG (ref 26–34)
MCHC RBC AUTO-ENTMCNC: 32.3 G/DL (ref 31–37)
MCV RBC AUTO: 90.3 FL
MONOCYTES # BLD AUTO: 0.38 X10(3) UL (ref 0.1–1)
MONOCYTES NFR BLD AUTO: 8.7 %
NEUTROPHILS # BLD AUTO: 1.88 X10 (3) UL (ref 1.5–7.7)
NEUTROPHILS # BLD AUTO: 1.88 X10(3) UL (ref 1.5–7.7)
NEUTROPHILS NFR BLD AUTO: 43.3 %
PLATELET # BLD AUTO: 289 10(3)UL (ref 150–450)
RBC # BLD AUTO: 4.01 X10(6)UL
TIBC SERPL-MCNC: 389 UG/DL (ref 240–450)
TRANSFERRIN SERPL-MCNC: 261 MG/DL (ref 200–360)
VIT D+METAB SERPL-MCNC: 34.3 NG/ML (ref 30–100)
WBC # BLD AUTO: 4.4 X10(3) UL (ref 4–11)

## 2022-01-14 PROCEDURE — 84466 ASSAY OF TRANSFERRIN: CPT

## 2022-01-14 PROCEDURE — 85025 COMPLETE CBC W/AUTO DIFF WBC: CPT

## 2022-01-14 PROCEDURE — 82728 ASSAY OF FERRITIN: CPT

## 2022-01-14 PROCEDURE — 0004A SARSCOV2 VAC 30MCG/0.3ML IM: CPT

## 2022-01-14 PROCEDURE — 82306 VITAMIN D 25 HYDROXY: CPT

## 2022-01-14 PROCEDURE — 83540 ASSAY OF IRON: CPT

## 2022-01-14 PROCEDURE — 36415 COLL VENOUS BLD VENIPUNCTURE: CPT

## 2022-01-14 PROCEDURE — 0054A SARSCOV2 VAC 30MCG/0.3ML IM: CPT

## 2022-01-18 ENCOUNTER — PATIENT MESSAGE (OUTPATIENT)
Dept: INTERNAL MEDICINE CLINIC | Facility: CLINIC | Age: 27
End: 2022-01-18

## 2022-01-18 DIAGNOSIS — D50.0 IRON DEFICIENCY ANEMIA DUE TO CHRONIC BLOOD LOSS: Primary | ICD-10-CM

## 2022-01-19 ENCOUNTER — TELEPHONE (OUTPATIENT)
Dept: INTERNAL MEDICINE CLINIC | Facility: CLINIC | Age: 27
End: 2022-01-19

## 2022-01-19 ENCOUNTER — PATIENT MESSAGE (OUTPATIENT)
Dept: INTERNAL MEDICINE CLINIC | Facility: CLINIC | Age: 27
End: 2022-01-19

## 2022-01-19 DIAGNOSIS — M67.432 GANGLION CYST OF DORSUM OF LEFT WRIST: Primary | ICD-10-CM

## 2022-01-19 NOTE — TELEPHONE ENCOUNTER
----- Message from Mil Obando Person sent at 1/18/2022  9:01 PM CST -----  Regarding: labs  Hi Dr. Jeovany Connor,    Will continue to take both the Vitamin D and Iron. Glad my levels are normalizing.      Question, do you know someone under [place] who drains g

## 2022-03-12 ENCOUNTER — LAB REQUISITION (OUTPATIENT)
Dept: SURGERY | Age: 27
End: 2022-03-12
Payer: COMMERCIAL

## 2022-03-13 LAB — SARS-COV-2 RNA RESP QL NAA+PROBE: NOT DETECTED

## 2022-03-15 ENCOUNTER — LAB REQUISITION (OUTPATIENT)
Dept: SURGERY | Age: 27
End: 2022-03-15
Payer: COMMERCIAL

## 2022-03-15 PROCEDURE — 88304 TISSUE EXAM BY PATHOLOGIST: CPT | Performed by: PLASTIC SURGERY

## 2022-10-11 ENCOUNTER — OFFICE VISIT (OUTPATIENT)
Dept: INTERNAL MEDICINE CLINIC | Facility: CLINIC | Age: 27
End: 2022-10-11
Payer: COMMERCIAL

## 2022-10-11 ENCOUNTER — LAB ENCOUNTER (OUTPATIENT)
Dept: LAB | Facility: HOSPITAL | Age: 27
End: 2022-10-11
Attending: INTERNAL MEDICINE
Payer: COMMERCIAL

## 2022-10-11 VITALS
SYSTOLIC BLOOD PRESSURE: 112 MMHG | WEIGHT: 143.13 LBS | DIASTOLIC BLOOD PRESSURE: 70 MMHG | BODY MASS INDEX: 25.05 KG/M2 | HEIGHT: 63.5 IN | HEART RATE: 97 BPM | OXYGEN SATURATION: 98 %

## 2022-10-11 DIAGNOSIS — E55.9 VITAMIN D DEFICIENCY: ICD-10-CM

## 2022-10-11 DIAGNOSIS — Z11.3 SCREEN FOR STD (SEXUALLY TRANSMITTED DISEASE): ICD-10-CM

## 2022-10-11 DIAGNOSIS — D50.0 IRON DEFICIENCY ANEMIA DUE TO CHRONIC BLOOD LOSS: ICD-10-CM

## 2022-10-11 DIAGNOSIS — Z00.00 ROUTINE HEALTH MAINTENANCE: ICD-10-CM

## 2022-10-11 DIAGNOSIS — Z00.00 ROUTINE HEALTH MAINTENANCE: Primary | ICD-10-CM

## 2022-10-11 PROBLEM — M26.213 MALOCCLUSION, ANGLE'S CLASS III: Status: RESOLVED | Noted: 2019-05-14 | Resolved: 2022-10-11

## 2022-10-11 PROBLEM — M26.29: Status: RESOLVED | Noted: 2019-05-14 | Resolved: 2022-10-11

## 2022-10-11 LAB
ALBUMIN SERPL-MCNC: 4.1 G/DL (ref 3.4–5)
ALBUMIN/GLOB SERPL: 1.1 {RATIO} (ref 1–2)
ALP LIVER SERPL-CCNC: 49 U/L
ALT SERPL-CCNC: 15 U/L
ANION GAP SERPL CALC-SCNC: 6 MMOL/L (ref 0–18)
AST SERPL-CCNC: 12 U/L (ref 15–37)
BASOPHILS # BLD AUTO: 0.02 X10(3) UL (ref 0–0.2)
BASOPHILS NFR BLD AUTO: 0.4 %
BILIRUB SERPL-MCNC: 1.3 MG/DL (ref 0.1–2)
BUN BLD-MCNC: 9 MG/DL (ref 7–18)
BUN/CREAT SERPL: 12.2 (ref 10–20)
CALCIUM BLD-MCNC: 8.9 MG/DL (ref 8.5–10.1)
CHLORIDE SERPL-SCNC: 108 MMOL/L (ref 98–112)
CHOLEST SERPL-MCNC: 176 MG/DL (ref ?–200)
CO2 SERPL-SCNC: 25 MMOL/L (ref 21–32)
CREAT BLD-MCNC: 0.74 MG/DL
DEPRECATED HBV CORE AB SER IA-ACNC: 14.4 NG/ML
DEPRECATED RDW RBC AUTO: 39.9 FL (ref 35.1–46.3)
EOSINOPHIL # BLD AUTO: 0.06 X10(3) UL (ref 0–0.7)
EOSINOPHIL NFR BLD AUTO: 1.3 %
ERYTHROCYTE [DISTWIDTH] IN BLOOD BY AUTOMATED COUNT: 12.3 % (ref 11–15)
FASTING PATIENT LIPID ANSWER: YES
FASTING STATUS PATIENT QL REPORTED: YES
GFR SERPLBLD BASED ON 1.73 SQ M-ARVRAT: 114 ML/MIN/1.73M2 (ref 60–?)
GLOBULIN PLAS-MCNC: 3.8 G/DL (ref 2.8–4.4)
GLUCOSE BLD-MCNC: 88 MG/DL (ref 70–99)
HCT VFR BLD AUTO: 37.3 %
HDLC SERPL-MCNC: 84 MG/DL (ref 40–59)
HGB BLD-MCNC: 12.2 G/DL
IMM GRANULOCYTES # BLD AUTO: 0.01 X10(3) UL (ref 0–1)
IMM GRANULOCYTES NFR BLD: 0.2 %
IRON SATN MFR SERPL: 34 %
IRON SERPL-MCNC: 174 UG/DL
LDLC SERPL CALC-MCNC: 82 MG/DL (ref ?–100)
LYMPHOCYTES # BLD AUTO: 1.75 X10(3) UL (ref 1–4)
LYMPHOCYTES NFR BLD AUTO: 36.9 %
MCH RBC QN AUTO: 29.1 PG (ref 26–34)
MCHC RBC AUTO-ENTMCNC: 32.7 G/DL (ref 31–37)
MCV RBC AUTO: 89 FL
MONOCYTES # BLD AUTO: 0.28 X10(3) UL (ref 0.1–1)
MONOCYTES NFR BLD AUTO: 5.9 %
NEUTROPHILS # BLD AUTO: 2.62 X10 (3) UL (ref 1.5–7.7)
NEUTROPHILS # BLD AUTO: 2.62 X10(3) UL (ref 1.5–7.7)
NEUTROPHILS NFR BLD AUTO: 55.3 %
NONHDLC SERPL-MCNC: 92 MG/DL (ref ?–130)
OSMOLALITY SERPL CALC.SUM OF ELEC: 286 MOSM/KG (ref 275–295)
PLATELET # BLD AUTO: 254 10(3)UL (ref 150–450)
POTASSIUM SERPL-SCNC: 3.7 MMOL/L (ref 3.5–5.1)
PROT SERPL-MCNC: 7.9 G/DL (ref 6.4–8.2)
RBC # BLD AUTO: 4.19 X10(6)UL
SODIUM SERPL-SCNC: 139 MMOL/L (ref 136–145)
TIBC SERPL-MCNC: 519 UG/DL (ref 240–450)
TRANSFERRIN SERPL-MCNC: 348 MG/DL (ref 200–360)
TRIGL SERPL-MCNC: 50 MG/DL (ref 30–149)
TSI SER-ACNC: 0.99 MIU/ML (ref 0.36–3.74)
VIT D+METAB SERPL-MCNC: 10.9 NG/ML (ref 30–100)
VLDLC SERPL CALC-MCNC: 8 MG/DL (ref 0–30)
WBC # BLD AUTO: 4.7 X10(3) UL (ref 4–11)

## 2022-10-11 PROCEDURE — 85025 COMPLETE CBC W/AUTO DIFF WBC: CPT

## 2022-10-11 PROCEDURE — 3074F SYST BP LT 130 MM HG: CPT | Performed by: INTERNAL MEDICINE

## 2022-10-11 PROCEDURE — 80061 LIPID PANEL: CPT

## 2022-10-11 PROCEDURE — 87491 CHLMYD TRACH DNA AMP PROBE: CPT

## 2022-10-11 PROCEDURE — 3078F DIAST BP <80 MM HG: CPT | Performed by: INTERNAL MEDICINE

## 2022-10-11 PROCEDURE — 90472 IMMUNIZATION ADMIN EACH ADD: CPT | Performed by: INTERNAL MEDICINE

## 2022-10-11 PROCEDURE — 86780 TREPONEMA PALLIDUM: CPT

## 2022-10-11 PROCEDURE — 3008F BODY MASS INDEX DOCD: CPT | Performed by: INTERNAL MEDICINE

## 2022-10-11 PROCEDURE — 83540 ASSAY OF IRON: CPT

## 2022-10-11 PROCEDURE — 87389 HIV-1 AG W/HIV-1&-2 AB AG IA: CPT

## 2022-10-11 PROCEDURE — 80053 COMPREHEN METABOLIC PANEL: CPT

## 2022-10-11 PROCEDURE — 82306 VITAMIN D 25 HYDROXY: CPT

## 2022-10-11 PROCEDURE — 99395 PREV VISIT EST AGE 18-39: CPT | Performed by: INTERNAL MEDICINE

## 2022-10-11 PROCEDURE — 82728 ASSAY OF FERRITIN: CPT

## 2022-10-11 PROCEDURE — 36415 COLL VENOUS BLD VENIPUNCTURE: CPT

## 2022-10-11 PROCEDURE — 84466 ASSAY OF TRANSFERRIN: CPT

## 2022-10-11 PROCEDURE — 84443 ASSAY THYROID STIM HORMONE: CPT | Performed by: INTERNAL MEDICINE

## 2022-10-11 PROCEDURE — 90686 IIV4 VACC NO PRSV 0.5 ML IM: CPT | Performed by: INTERNAL MEDICINE

## 2022-10-11 PROCEDURE — 90471 IMMUNIZATION ADMIN: CPT | Performed by: INTERNAL MEDICINE

## 2022-10-11 PROCEDURE — 90651 9VHPV VACCINE 2/3 DOSE IM: CPT | Performed by: INTERNAL MEDICINE

## 2022-10-11 PROCEDURE — 87591 N.GONORRHOEAE DNA AMP PROB: CPT

## 2022-10-12 LAB
C TRACH DNA SPEC QL NAA+PROBE: NEGATIVE
N GONORRHOEA DNA SPEC QL NAA+PROBE: NEGATIVE
T PALLIDUM AB SER QL: NEGATIVE

## 2022-10-25 DIAGNOSIS — E55.9 VITAMIN D DEFICIENCY: Primary | ICD-10-CM

## 2022-10-25 RX ORDER — ERGOCALCIFEROL 1.25 MG/1
50000 CAPSULE ORAL WEEKLY
Qty: 13 CAPSULE | Refills: 1 | Status: SHIPPED | OUTPATIENT
Start: 2022-10-25 | End: 2023-04-25

## 2022-10-26 ENCOUNTER — OFFICE VISIT (OUTPATIENT)
Dept: INTERNAL MEDICINE CLINIC | Facility: CLINIC | Age: 27
End: 2022-10-26
Payer: COMMERCIAL

## 2022-10-26 VITALS
WEIGHT: 144 LBS | SYSTOLIC BLOOD PRESSURE: 126 MMHG | HEART RATE: 100 BPM | TEMPERATURE: 98 F | OXYGEN SATURATION: 98 % | DIASTOLIC BLOOD PRESSURE: 80 MMHG | HEIGHT: 63.5 IN | BODY MASS INDEX: 25.2 KG/M2

## 2022-10-26 DIAGNOSIS — Z12.4 SCREENING FOR CERVICAL CANCER: Primary | ICD-10-CM

## 2022-10-26 PROCEDURE — 3008F BODY MASS INDEX DOCD: CPT | Performed by: INTERNAL MEDICINE

## 2022-10-26 PROCEDURE — 3074F SYST BP LT 130 MM HG: CPT | Performed by: INTERNAL MEDICINE

## 2022-10-26 PROCEDURE — 3079F DIAST BP 80-89 MM HG: CPT | Performed by: INTERNAL MEDICINE

## 2022-10-26 PROCEDURE — 99213 OFFICE O/P EST LOW 20 MIN: CPT | Performed by: INTERNAL MEDICINE

## 2023-03-07 ENCOUNTER — HOSPITAL ENCOUNTER (OUTPATIENT)
Dept: ULTRASOUND IMAGING | Facility: HOSPITAL | Age: 28
Discharge: HOME | End: 2023-03-07
Attending: INTERNAL MEDICINE
Payer: COMMERCIAL

## 2023-03-07 ENCOUNTER — OFFICE VISIT (OUTPATIENT)
Dept: INTERNAL MEDICINE CLINIC | Facility: CLINIC | Age: 28
End: 2023-03-07

## 2023-03-07 ENCOUNTER — LAB ENCOUNTER (OUTPATIENT)
Dept: LAB | Facility: HOSPITAL | Age: 28
End: 2023-03-07
Attending: INTERNAL MEDICINE
Payer: COMMERCIAL

## 2023-03-07 VITALS
BODY MASS INDEX: 25.55 KG/M2 | HEART RATE: 88 BPM | OXYGEN SATURATION: 98 % | DIASTOLIC BLOOD PRESSURE: 80 MMHG | SYSTOLIC BLOOD PRESSURE: 130 MMHG | HEIGHT: 63.5 IN | TEMPERATURE: 98 F | WEIGHT: 146 LBS

## 2023-03-07 DIAGNOSIS — R10.824 LEFT LOWER QUADRANT ABDOMINAL TENDERNESS WITH REBOUND TENDERNESS: ICD-10-CM

## 2023-03-07 DIAGNOSIS — R10.30 LOWER ABDOMINAL PAIN: ICD-10-CM

## 2023-03-07 DIAGNOSIS — R10.30 LOWER ABDOMINAL PAIN: Primary | ICD-10-CM

## 2023-03-07 LAB
BASOPHILS # BLD AUTO: 0.02 X10(3) UL (ref 0–0.2)
BASOPHILS NFR BLD AUTO: 0.3 %
BILIRUB UR QL: NEGATIVE
BILIRUBIN: NEGATIVE
CLARITY UR: CLEAR
DEPRECATED RDW RBC AUTO: 40.8 FL (ref 35.1–46.3)
EOSINOPHIL # BLD AUTO: 0.03 X10(3) UL (ref 0–0.7)
EOSINOPHIL NFR BLD AUTO: 0.5 %
ERYTHROCYTE [DISTWIDTH] IN BLOOD BY AUTOMATED COUNT: 12.7 % (ref 11–15)
GLUCOSE (URINE DIPSTICK): NEGATIVE MG/DL
GLUCOSE UR-MCNC: NORMAL MG/DL
HCT VFR BLD AUTO: 35.3 %
HGB BLD-MCNC: 11.4 G/DL
HGB UR QL STRIP.AUTO: NEGATIVE
IMM GRANULOCYTES # BLD AUTO: 0.02 X10(3) UL (ref 0–1)
IMM GRANULOCYTES NFR BLD: 0.3 %
KETONES (URINE DIPSTICK): NEGATIVE MG/DL
KETONES UR-MCNC: NEGATIVE MG/DL
LEUKOCYTE ESTERASE UR QL STRIP.AUTO: NEGATIVE
LEUKOCYTES: NEGATIVE
LYMPHOCYTES # BLD AUTO: 1.92 X10(3) UL (ref 1–4)
LYMPHOCYTES NFR BLD AUTO: 29.8 %
MCH RBC QN AUTO: 28.1 PG (ref 26–34)
MCHC RBC AUTO-ENTMCNC: 32.3 G/DL (ref 31–37)
MCV RBC AUTO: 86.9 FL
MONOCYTES # BLD AUTO: 0.49 X10(3) UL (ref 0.1–1)
MONOCYTES NFR BLD AUTO: 7.6 %
MULTISTIX LOT#: ABNORMAL NUMERIC
NEUTROPHILS # BLD AUTO: 3.96 X10 (3) UL (ref 1.5–7.7)
NEUTROPHILS # BLD AUTO: 3.96 X10(3) UL (ref 1.5–7.7)
NEUTROPHILS NFR BLD AUTO: 61.5 %
NITRITE UR QL STRIP.AUTO: NEGATIVE
NITRITE, URINE: NEGATIVE
PH UR: 6.5 [PH] (ref 5–8)
PH, URINE: 7 (ref 4.5–8)
PLATELET # BLD AUTO: 272 10(3)UL (ref 150–450)
PROT UR-MCNC: NEGATIVE MG/DL
PROTEIN (URINE DIPSTICK): NEGATIVE MG/DL
RBC # BLD AUTO: 4.06 X10(6)UL
SP GR UR STRIP: 1.02 (ref 1–1.03)
SPECIFIC GRAVITY: 1.01 (ref 1–1.03)
URINE-COLOR: YELLOW
UROBILINOGEN UR STRIP-ACNC: NORMAL
UROBILINOGEN,SEMI-QN: 0.2 MG/DL (ref 0–1.9)
WBC # BLD AUTO: 6.4 X10(3) UL (ref 4–11)

## 2023-03-07 PROCEDURE — 36415 COLL VENOUS BLD VENIPUNCTURE: CPT

## 2023-03-07 PROCEDURE — 3075F SYST BP GE 130 - 139MM HG: CPT | Performed by: INTERNAL MEDICINE

## 2023-03-07 PROCEDURE — 76856 US EXAM PELVIC COMPLETE: CPT | Performed by: INTERNAL MEDICINE

## 2023-03-07 PROCEDURE — 85025 COMPLETE CBC W/AUTO DIFF WBC: CPT

## 2023-03-07 PROCEDURE — 3079F DIAST BP 80-89 MM HG: CPT | Performed by: INTERNAL MEDICINE

## 2023-03-07 PROCEDURE — 76830 TRANSVAGINAL US NON-OB: CPT | Performed by: INTERNAL MEDICINE

## 2023-03-07 PROCEDURE — 93975 VASCULAR STUDY: CPT | Performed by: INTERNAL MEDICINE

## 2023-03-07 PROCEDURE — 81002 URINALYSIS NONAUTO W/O SCOPE: CPT | Performed by: INTERNAL MEDICINE

## 2023-03-07 PROCEDURE — 3008F BODY MASS INDEX DOCD: CPT | Performed by: INTERNAL MEDICINE

## 2023-03-07 PROCEDURE — 99214 OFFICE O/P EST MOD 30 MIN: CPT | Performed by: INTERNAL MEDICINE

## 2023-03-23 DIAGNOSIS — D50.0 IRON DEFICIENCY ANEMIA DUE TO CHRONIC BLOOD LOSS: ICD-10-CM

## 2023-03-23 DIAGNOSIS — E55.9 VITAMIN D DEFICIENCY: Primary | ICD-10-CM

## 2023-03-27 DIAGNOSIS — R31.29 MICROHEMATURIA: Primary | ICD-10-CM

## 2023-05-02 ENCOUNTER — LAB ENCOUNTER (OUTPATIENT)
Dept: LAB | Age: 28
End: 2023-05-02
Attending: INTERNAL MEDICINE
Payer: COMMERCIAL

## 2023-05-02 DIAGNOSIS — E55.9 VITAMIN D DEFICIENCY: ICD-10-CM

## 2023-05-02 DIAGNOSIS — D50.0 IRON DEFICIENCY ANEMIA DUE TO CHRONIC BLOOD LOSS: ICD-10-CM

## 2023-05-02 LAB
BASOPHILS # BLD AUTO: 0.03 X10(3) UL (ref 0–0.2)
BASOPHILS NFR BLD AUTO: 0.6 %
BILIRUB UR QL: NEGATIVE
CLARITY UR: CLEAR
COLOR UR: YELLOW
DEPRECATED HBV CORE AB SER IA-ACNC: 5.9 NG/ML
DEPRECATED RDW RBC AUTO: 42.3 FL (ref 35.1–46.3)
EOSINOPHIL # BLD AUTO: 0.05 X10(3) UL (ref 0–0.7)
EOSINOPHIL NFR BLD AUTO: 1 %
ERYTHROCYTE [DISTWIDTH] IN BLOOD BY AUTOMATED COUNT: 12.9 % (ref 11–15)
GLUCOSE UR-MCNC: NORMAL MG/DL
HCT VFR BLD AUTO: 35 %
HGB BLD-MCNC: 10.8 G/DL
HGB UR QL STRIP.AUTO: NEGATIVE
IMM GRANULOCYTES # BLD AUTO: 0.01 X10(3) UL (ref 0–1)
IMM GRANULOCYTES NFR BLD: 0.2 %
IRON SATN MFR SERPL: 9 %
IRON SERPL-MCNC: 39 UG/DL
KETONES UR-MCNC: NEGATIVE MG/DL
LEUKOCYTE ESTERASE UR QL STRIP.AUTO: NEGATIVE
LYMPHOCYTES # BLD AUTO: 1.78 X10(3) UL (ref 1–4)
LYMPHOCYTES NFR BLD AUTO: 35.9 %
MCH RBC QN AUTO: 27.6 PG (ref 26–34)
MCHC RBC AUTO-ENTMCNC: 30.9 G/DL (ref 31–37)
MCV RBC AUTO: 89.3 FL
MONOCYTES # BLD AUTO: 0.37 X10(3) UL (ref 0.1–1)
MONOCYTES NFR BLD AUTO: 7.5 %
NEUTROPHILS # BLD AUTO: 2.72 X10 (3) UL (ref 1.5–7.7)
NEUTROPHILS # BLD AUTO: 2.72 X10(3) UL (ref 1.5–7.7)
NEUTROPHILS NFR BLD AUTO: 54.8 %
NITRITE UR QL STRIP.AUTO: NEGATIVE
PH UR: 6.5 [PH] (ref 5–8)
PLATELET # BLD AUTO: 340 10(3)UL (ref 150–450)
PROT UR-MCNC: NEGATIVE MG/DL
RBC # BLD AUTO: 3.92 X10(6)UL
SP GR UR STRIP: 1.02 (ref 1–1.03)
TIBC SERPL-MCNC: 456 UG/DL (ref 240–450)
TRANSFERRIN SERPL-MCNC: 306 MG/DL (ref 200–360)
UROBILINOGEN UR STRIP-ACNC: NORMAL
VIT B12 SERPL-MCNC: 921 PG/ML (ref 193–986)
VIT D+METAB SERPL-MCNC: 17.4 NG/ML (ref 30–100)
WBC # BLD AUTO: 5 X10(3) UL (ref 4–11)

## 2023-05-02 PROCEDURE — 83540 ASSAY OF IRON: CPT

## 2023-05-02 PROCEDURE — 82728 ASSAY OF FERRITIN: CPT

## 2023-05-02 PROCEDURE — 85025 COMPLETE CBC W/AUTO DIFF WBC: CPT

## 2023-05-02 PROCEDURE — 36415 COLL VENOUS BLD VENIPUNCTURE: CPT

## 2023-05-02 PROCEDURE — 82607 VITAMIN B-12: CPT

## 2023-05-02 PROCEDURE — 84466 ASSAY OF TRANSFERRIN: CPT

## 2023-05-02 PROCEDURE — 82306 VITAMIN D 25 HYDROXY: CPT

## 2023-05-03 DIAGNOSIS — D50.0 IRON DEFICIENCY ANEMIA DUE TO CHRONIC BLOOD LOSS: Primary | ICD-10-CM

## 2023-05-03 DIAGNOSIS — E55.9 VITAMIN D DEFICIENCY: ICD-10-CM

## 2023-05-03 RX ORDER — FERROUS SULFATE 325(65) MG
325 TABLET ORAL
Qty: 1 TABLET | Refills: 0 | COMMUNITY
Start: 2023-05-03

## 2023-05-03 NOTE — TELEPHONE ENCOUNTER
Please call with labs. 1. She is anemic and iron is low. Start OTC ferrous sulfate 325mg once a day. Repeat labs in 3 months    2. Vitamin D level is slightly better but still low. Is she still taking the weekly vitamin D? I would like to continue the weekly vitamin D and repeat another level in 3 mos. Please send a new Rx if needed.     3. Repeat urinalysis was normal, no blood

## 2023-05-05 RX ORDER — ERGOCALCIFEROL 1.25 MG/1
50000 CAPSULE ORAL WEEKLY
Qty: 13 CAPSULE | Refills: 3 | Status: SHIPPED | OUTPATIENT
Start: 2023-05-05

## 2023-05-05 NOTE — TELEPHONE ENCOUNTER
Spoke with patient and relayed MD's message. Verbalized understanding and agreement with plan. Advised to take Ferrous Sulfate with food if stomach upset and informed that stools may turn dark colored. Patient has not taken Vit D Rx in a \"few months\". Advised to restart. Pended script eRx'd to preferred pharmacy. Per pt request, MDs result message sent via [employer] as reference.

## 2023-06-18 ENCOUNTER — OFFICE VISIT (OUTPATIENT)
Dept: FAMILY MEDICINE CLINIC | Facility: CLINIC | Age: 28
End: 2023-06-18
Payer: COMMERCIAL

## 2023-06-18 VITALS
BODY MASS INDEX: 25 KG/M2 | SYSTOLIC BLOOD PRESSURE: 128 MMHG | OXYGEN SATURATION: 100 % | TEMPERATURE: 98 F | RESPIRATION RATE: 18 BRPM | DIASTOLIC BLOOD PRESSURE: 93 MMHG | WEIGHT: 142 LBS | HEART RATE: 93 BPM

## 2023-06-18 DIAGNOSIS — L30.9 DERMATITIS: Primary | ICD-10-CM

## 2023-06-18 DIAGNOSIS — R03.0 ELEVATED BLOOD PRESSURE READING: ICD-10-CM

## 2023-06-18 PROCEDURE — 3080F DIAST BP >= 90 MM HG: CPT | Performed by: NURSE PRACTITIONER

## 2023-06-18 PROCEDURE — 99203 OFFICE O/P NEW LOW 30 MIN: CPT | Performed by: NURSE PRACTITIONER

## 2023-06-18 PROCEDURE — 3074F SYST BP LT 130 MM HG: CPT | Performed by: NURSE PRACTITIONER

## 2023-06-18 RX ORDER — TRIAMCINOLONE ACETONIDE 1 MG/G
CREAM TOPICAL 2 TIMES DAILY PRN
Qty: 60 G | Refills: 3 | Status: SHIPPED | OUTPATIENT
Start: 2023-06-18 | End: 2023-07-02

## 2023-07-20 ENCOUNTER — OFFICE VISIT (OUTPATIENT)
Dept: INTERNAL MEDICINE CLINIC | Facility: CLINIC | Age: 28
End: 2023-07-20

## 2023-07-20 ENCOUNTER — LAB ENCOUNTER (OUTPATIENT)
Dept: LAB | Facility: HOSPITAL | Age: 28
End: 2023-07-20
Attending: INTERNAL MEDICINE
Payer: COMMERCIAL

## 2023-07-20 VITALS
SYSTOLIC BLOOD PRESSURE: 135 MMHG | BODY MASS INDEX: 24.67 KG/M2 | HEIGHT: 63.5 IN | TEMPERATURE: 99 F | HEART RATE: 78 BPM | DIASTOLIC BLOOD PRESSURE: 93 MMHG | WEIGHT: 141 LBS | OXYGEN SATURATION: 100 %

## 2023-07-20 DIAGNOSIS — E55.9 VITAMIN D DEFICIENCY: ICD-10-CM

## 2023-07-20 DIAGNOSIS — D50.0 IRON DEFICIENCY ANEMIA DUE TO CHRONIC BLOOD LOSS: ICD-10-CM

## 2023-07-20 DIAGNOSIS — R03.0 ELEVATED BLOOD PRESSURE READING: ICD-10-CM

## 2023-07-20 DIAGNOSIS — Z30.09 ENCOUNTER FOR OTHER GENERAL COUNSELING OR ADVICE ON CONTRACEPTION: Primary | ICD-10-CM

## 2023-07-20 LAB
BASOPHILS # BLD AUTO: 0.02 X10(3) UL (ref 0–0.2)
BASOPHILS NFR BLD AUTO: 0.4 %
DEPRECATED HBV CORE AB SER IA-ACNC: 8.1 NG/ML
DEPRECATED RDW RBC AUTO: 44.2 FL (ref 35.1–46.3)
EOSINOPHIL # BLD AUTO: 0.06 X10(3) UL (ref 0–0.7)
EOSINOPHIL NFR BLD AUTO: 1.1 %
ERYTHROCYTE [DISTWIDTH] IN BLOOD BY AUTOMATED COUNT: 13.7 % (ref 11–15)
HCT VFR BLD AUTO: 36.9 %
HGB BLD-MCNC: 12 G/DL
IMM GRANULOCYTES # BLD AUTO: 0.01 X10(3) UL (ref 0–1)
IMM GRANULOCYTES NFR BLD: 0.2 %
IRON SATN MFR SERPL: 13 %
IRON SERPL-MCNC: 61 UG/DL
LYMPHOCYTES # BLD AUTO: 2.25 X10(3) UL (ref 1–4)
LYMPHOCYTES NFR BLD AUTO: 39.8 %
MCH RBC QN AUTO: 28.6 PG (ref 26–34)
MCHC RBC AUTO-ENTMCNC: 32.5 G/DL (ref 31–37)
MCV RBC AUTO: 87.9 FL
MONOCYTES # BLD AUTO: 0.56 X10(3) UL (ref 0.1–1)
MONOCYTES NFR BLD AUTO: 9.9 %
NEUTROPHILS # BLD AUTO: 2.75 X10 (3) UL (ref 1.5–7.7)
NEUTROPHILS # BLD AUTO: 2.75 X10(3) UL (ref 1.5–7.7)
NEUTROPHILS NFR BLD AUTO: 48.6 %
PLATELET # BLD AUTO: 277 10(3)UL (ref 150–450)
RBC # BLD AUTO: 4.2 X10(6)UL
TIBC SERPL-MCNC: 480 UG/DL (ref 240–450)
TRANSFERRIN SERPL-MCNC: 322 MG/DL (ref 200–360)
VIT D+METAB SERPL-MCNC: 28.1 NG/ML (ref 30–100)
WBC # BLD AUTO: 5.7 X10(3) UL (ref 4–11)

## 2023-07-20 PROCEDURE — 3008F BODY MASS INDEX DOCD: CPT | Performed by: INTERNAL MEDICINE

## 2023-07-20 PROCEDURE — 84466 ASSAY OF TRANSFERRIN: CPT

## 2023-07-20 PROCEDURE — 99214 OFFICE O/P EST MOD 30 MIN: CPT | Performed by: INTERNAL MEDICINE

## 2023-07-20 PROCEDURE — 3080F DIAST BP >= 90 MM HG: CPT | Performed by: INTERNAL MEDICINE

## 2023-07-20 PROCEDURE — 82306 VITAMIN D 25 HYDROXY: CPT

## 2023-07-20 PROCEDURE — 3075F SYST BP GE 130 - 139MM HG: CPT | Performed by: INTERNAL MEDICINE

## 2023-07-20 PROCEDURE — 36415 COLL VENOUS BLD VENIPUNCTURE: CPT

## 2023-07-20 PROCEDURE — 85025 COMPLETE CBC W/AUTO DIFF WBC: CPT

## 2023-07-20 PROCEDURE — 82728 ASSAY OF FERRITIN: CPT

## 2023-07-20 PROCEDURE — 83540 ASSAY OF IRON: CPT

## 2023-12-13 ENCOUNTER — OFFICE VISIT (OUTPATIENT)
Dept: INTERNAL MEDICINE CLINIC | Facility: CLINIC | Age: 28
End: 2023-12-13

## 2023-12-13 ENCOUNTER — LAB ENCOUNTER (OUTPATIENT)
Dept: LAB | Age: 28
End: 2023-12-13
Attending: INTERNAL MEDICINE
Payer: COMMERCIAL

## 2023-12-13 VITALS
WEIGHT: 148 LBS | DIASTOLIC BLOOD PRESSURE: 70 MMHG | HEIGHT: 63.5 IN | TEMPERATURE: 98 F | HEART RATE: 78 BPM | OXYGEN SATURATION: 99 % | SYSTOLIC BLOOD PRESSURE: 110 MMHG | BODY MASS INDEX: 25.9 KG/M2

## 2023-12-13 DIAGNOSIS — Z00.00 ROUTINE HEALTH MAINTENANCE: Primary | ICD-10-CM

## 2023-12-13 DIAGNOSIS — D50.0 IRON DEFICIENCY ANEMIA DUE TO CHRONIC BLOOD LOSS: ICD-10-CM

## 2023-12-13 DIAGNOSIS — E55.9 VITAMIN D DEFICIENCY: ICD-10-CM

## 2023-12-13 DIAGNOSIS — Z11.3 SCREEN FOR STD (SEXUALLY TRANSMITTED DISEASE): ICD-10-CM

## 2023-12-13 DIAGNOSIS — Z00.00 ROUTINE HEALTH MAINTENANCE: ICD-10-CM

## 2023-12-13 LAB
ALBUMIN SERPL-MCNC: 4.4 G/DL (ref 3.2–4.8)
ALBUMIN/GLOB SERPL: 1.4 {RATIO} (ref 1–2)
ALP LIVER SERPL-CCNC: 46 U/L
ALT SERPL-CCNC: 10 U/L
ANION GAP SERPL CALC-SCNC: 8 MMOL/L (ref 0–18)
AST SERPL-CCNC: 15 U/L (ref ?–34)
BASOPHILS # BLD AUTO: 0.02 X10(3) UL (ref 0–0.2)
BASOPHILS NFR BLD AUTO: 0.4 %
BILIRUB SERPL-MCNC: 0.5 MG/DL (ref 0.3–1.2)
BUN BLD-MCNC: 10 MG/DL (ref 9–23)
BUN/CREAT SERPL: 13.9 (ref 10–20)
CALCIUM BLD-MCNC: 9.4 MG/DL (ref 8.7–10.4)
CHLORIDE SERPL-SCNC: 104 MMOL/L (ref 98–112)
CHOLEST SERPL-MCNC: 187 MG/DL (ref ?–200)
CO2 SERPL-SCNC: 26 MMOL/L (ref 21–32)
CREAT BLD-MCNC: 0.72 MG/DL
DEPRECATED HBV CORE AB SER IA-ACNC: 7.7 NG/ML
DEPRECATED RDW RBC AUTO: 41.5 FL (ref 35.1–46.3)
EGFRCR SERPLBLD CKD-EPI 2021: 117 ML/MIN/1.73M2 (ref 60–?)
EOSINOPHIL # BLD AUTO: 0.09 X10(3) UL (ref 0–0.7)
EOSINOPHIL NFR BLD AUTO: 1.9 %
ERYTHROCYTE [DISTWIDTH] IN BLOOD BY AUTOMATED COUNT: 13.2 % (ref 11–15)
FASTING PATIENT LIPID ANSWER: NO
FASTING STATUS PATIENT QL REPORTED: NO
GLOBULIN PLAS-MCNC: 3.2 G/DL (ref 2.8–4.4)
GLUCOSE BLD-MCNC: 90 MG/DL (ref 70–99)
HCT VFR BLD AUTO: 33.7 %
HDLC SERPL-MCNC: 90 MG/DL (ref 40–59)
HGB BLD-MCNC: 11.1 G/DL
IMM GRANULOCYTES # BLD AUTO: 0.01 X10(3) UL (ref 0–1)
IMM GRANULOCYTES NFR BLD: 0.2 %
IRON SATN MFR SERPL: 8 %
IRON SERPL-MCNC: 39 UG/DL
LDLC SERPL CALC-MCNC: 91 MG/DL (ref ?–100)
LYMPHOCYTES # BLD AUTO: 2.07 X10(3) UL (ref 1–4)
LYMPHOCYTES NFR BLD AUTO: 44.1 %
MCH RBC QN AUTO: 28.5 PG (ref 26–34)
MCHC RBC AUTO-ENTMCNC: 32.9 G/DL (ref 31–37)
MCV RBC AUTO: 86.4 FL
MONOCYTES # BLD AUTO: 0.38 X10(3) UL (ref 0.1–1)
MONOCYTES NFR BLD AUTO: 8.1 %
NEUTROPHILS # BLD AUTO: 2.12 X10 (3) UL (ref 1.5–7.7)
NEUTROPHILS # BLD AUTO: 2.12 X10(3) UL (ref 1.5–7.7)
NEUTROPHILS NFR BLD AUTO: 45.3 %
NONHDLC SERPL-MCNC: 97 MG/DL (ref ?–130)
OSMOLALITY SERPL CALC.SUM OF ELEC: 285 MOSM/KG (ref 275–295)
PLATELET # BLD AUTO: 276 10(3)UL (ref 150–450)
POTASSIUM SERPL-SCNC: 3.9 MMOL/L (ref 3.5–5.1)
PROT SERPL-MCNC: 7.6 G/DL (ref 5.7–8.2)
RBC # BLD AUTO: 3.9 X10(6)UL
SODIUM SERPL-SCNC: 138 MMOL/L (ref 136–145)
T PALLIDUM AB SER QL IA: NONREACTIVE
TIBC SERPL-MCNC: 459 UG/DL (ref 250–425)
TRANSFERRIN SERPL-MCNC: 308 MG/DL (ref 250–380)
TRIGL SERPL-MCNC: 27 MG/DL (ref 30–149)
TSI SER-ACNC: 1.12 MIU/ML (ref 0.55–4.78)
VIT D+METAB SERPL-MCNC: 24.2 NG/ML (ref 30–100)
VLDLC SERPL CALC-MCNC: 4 MG/DL (ref 0–30)
WBC # BLD AUTO: 4.7 X10(3) UL (ref 4–11)

## 2023-12-13 PROCEDURE — 99395 PREV VISIT EST AGE 18-39: CPT | Performed by: INTERNAL MEDICINE

## 2023-12-13 PROCEDURE — 3074F SYST BP LT 130 MM HG: CPT | Performed by: INTERNAL MEDICINE

## 2023-12-13 PROCEDURE — 3008F BODY MASS INDEX DOCD: CPT | Performed by: INTERNAL MEDICINE

## 2023-12-13 PROCEDURE — 36415 COLL VENOUS BLD VENIPUNCTURE: CPT

## 2023-12-13 PROCEDURE — 87591 N.GONORRHOEAE DNA AMP PROB: CPT

## 2023-12-13 PROCEDURE — 3078F DIAST BP <80 MM HG: CPT | Performed by: INTERNAL MEDICINE

## 2023-12-13 PROCEDURE — 87491 CHLMYD TRACH DNA AMP PROBE: CPT

## 2023-12-13 PROCEDURE — 82728 ASSAY OF FERRITIN: CPT

## 2023-12-13 PROCEDURE — 86780 TREPONEMA PALLIDUM: CPT

## 2023-12-13 PROCEDURE — 84466 ASSAY OF TRANSFERRIN: CPT

## 2023-12-13 PROCEDURE — 82306 VITAMIN D 25 HYDROXY: CPT

## 2023-12-13 PROCEDURE — 80053 COMPREHEN METABOLIC PANEL: CPT

## 2023-12-13 PROCEDURE — 87389 HIV-1 AG W/HIV-1&-2 AB AG IA: CPT

## 2023-12-13 PROCEDURE — 80061 LIPID PANEL: CPT

## 2023-12-13 PROCEDURE — 83540 ASSAY OF IRON: CPT

## 2023-12-13 PROCEDURE — 84443 ASSAY THYROID STIM HORMONE: CPT | Performed by: INTERNAL MEDICINE

## 2023-12-13 PROCEDURE — 85025 COMPLETE CBC W/AUTO DIFF WBC: CPT

## 2023-12-13 RX ORDER — ERGOCALCIFEROL 1.25 MG/1
50000 CAPSULE ORAL WEEKLY
Qty: 13 CAPSULE | Refills: 3 | Status: SHIPPED | OUTPATIENT
Start: 2023-12-13

## 2023-12-14 LAB
C TRACH DNA SPEC QL NAA+PROBE: NEGATIVE
N GONORRHOEA DNA SPEC QL NAA+PROBE: NEGATIVE

## 2023-12-20 ENCOUNTER — TELEPHONE (OUTPATIENT)
Dept: INTERNAL MEDICINE CLINIC | Facility: CLINIC | Age: 28
End: 2023-12-20

## 2023-12-20 DIAGNOSIS — D50.0 IRON DEFICIENCY ANEMIA DUE TO CHRONIC BLOOD LOSS: Primary | ICD-10-CM

## 2023-12-21 NOTE — TELEPHONE ENCOUNTER
Please call with labs --    Vitamin D is low -- start OTC vitamin D 2000 units/day    She is anemic again and iron is very low. We had discussed trying SlowFe 160mg/day at her recent appt. If she can't tolerate the oral iron, then we may need to do an IV infusion.     Let's repeat CBC and iron studies in 3 mos (she should let me know, though, if the oral iron is not tolerated)    Other labs looked good

## 2023-12-22 NOTE — TELEPHONE ENCOUNTER
Spoke to patient and relayed MD message pt verbalized understanding and agree's to plan.    Per MD patient to take 86992 units not 2000

## 2024-02-27 ENCOUNTER — PATIENT MESSAGE (OUTPATIENT)
Dept: INTERNAL MEDICINE CLINIC | Facility: CLINIC | Age: 29
End: 2024-02-27

## 2024-02-27 NOTE — TELEPHONE ENCOUNTER
From: Cornelio Lew  To: Fabi Flynn  Sent: 2/27/2024 3:22 PM CST  Subject: Ganglion Cyst    Hi Dr. Flynn,    Would I need a referral with my insurance to see Dr. Derek Martinez, the doctor who removed my ganglion cyst the last time? Unfortunately, I have yet another one but this time it’s back on my dominant hand (right wrist). I’ve tried the old remedy to get rid of it but it just came right back. Due to my luck, had it been on my left hand I would just leave it alone. 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

## 2025-01-02 ENCOUNTER — OFFICE VISIT (OUTPATIENT)
Dept: INTERNAL MEDICINE CLINIC | Facility: CLINIC | Age: 30
End: 2025-01-02
Payer: COMMERCIAL

## 2025-01-02 VITALS
TEMPERATURE: 98 F | BODY MASS INDEX: 28 KG/M2 | SYSTOLIC BLOOD PRESSURE: 136 MMHG | DIASTOLIC BLOOD PRESSURE: 92 MMHG | HEIGHT: 63.5 IN | OXYGEN SATURATION: 99 % | WEIGHT: 160 LBS | HEART RATE: 88 BPM

## 2025-01-02 DIAGNOSIS — Z00.00 ROUTINE HEALTH MAINTENANCE: Primary | ICD-10-CM

## 2025-01-02 DIAGNOSIS — R03.0 ELEVATED BLOOD PRESSURE READING: ICD-10-CM

## 2025-01-02 DIAGNOSIS — E55.9 VITAMIN D DEFICIENCY: ICD-10-CM

## 2025-01-02 DIAGNOSIS — D50.0 IRON DEFICIENCY ANEMIA DUE TO CHRONIC BLOOD LOSS: ICD-10-CM

## 2025-01-02 NOTE — PROGRESS NOTES
Cornelio Lew is a 29 year old female.  Chief Complaint   Patient presents with    Physical     Patient is here today for a PE. Last pap exam was negative on 10/16/22. She states that she has been feeling good lately. No new or major issues at this time. Reviewed Preventative/Wellness form with patient.        HPI:   Cornelio Lew is a 29 year old female who presents for a complete physical exam.    Feels well.  No formal exercise but stays active    Menses still heavy.  Stopped taking iron b/c forgot to take it.   The SlowFe was better tolerated  Stopped taking weekly vitamin D several months ago.        Diphtheria-tetanus-acellular Pertussis (DTaP) Injection 11/10/2000, 10/06/1997, 09/26/1996, 08/01/1996, 06/06/1996     M-M-R II - MEASLES,MUMPS,RUBELLA VACCINE 10/22/2002, 11/10/2000     MENACTRA - MENINGOCOCCAL VACCINE A,C,Y,W-135 50 MCG 04/03/2008     Tetanus-Diphtheria-Acellular Pertussis (Tdap) Injection 04/03/2008     VARIVAX - VARICELLA VIRUS VACCINE LIVE 1,350 UNIT/0.5 ML 04/03/2008, 02/18/1999     haemophilus B conjugated injection (HIB) 10/06/1997, 09/26/1996, 08/01/1996, 06/06/1996     hepatitis B vaccine injection 11/10/2000, 06/06/1996, 04/08/1996     inactivated poliovirus vaccine injection/oral 11/10/2000, 09/26/1996, 08/01/1996, 06/06/1996            Wt Readings from Last 6 Encounters:   01/02/25 160 lb (72.6 kg)   12/13/23 148 lb (67.1 kg)   07/20/23 141 lb (64 kg)   06/18/23 142 lb (64.4 kg)   03/07/23 146 lb (66.2 kg)   10/26/22 144 lb (65.3 kg)     Body mass index is 27.9 kg/m².       Current Outpatient Medications   Medication Sig Dispense Refill    ergocalciferol 1.25 MG (50581 UT) Oral Cap Take 1 capsule (50,000 Units total) by mouth once a week. (Patient not taking: Reported on 1/2/2025) 13 capsule 3    Ferrous Sulfate 325 (65 Fe) MG Oral Tab Take 1 tablet (325 mg total) by mouth daily with breakfast. (Patient not taking: Reported on 1/2/2025) 1 tablet 0      Past Medical History:     Ganglion of left wrist    Excision ganglion of R dorsal wrist     Malocclusion with open bite    Malocclusion, Angle's class III    Psoriasis      Past Surgical History:   Procedure Laterality Date    Excis primary ganglion wrist Left 06/25/2019    Excision ganglion of R dorsal wrist     Jaw surgery  12/09/2020    double jaw surgery    Other surgical history Right around 2015    ganglion cyst removal    Other surgical history  12/2020    Jaw surgery    [place] teeth removed        Family History   Problem Relation Age of Onset    Thyroid disease Mother     Other (diverticulitis) Maternal Grandmother     Cancer Paternal Grandmother         Colon cancer    Asthma Brother     Cancer Maternal Cousin 18        lymphoma-finished chemo and doing well      Social History:   Social History     Socioeconomic History    Marital status: Single   Tobacco Use    Smoking status: Never    Smokeless tobacco: Never   Vaping Use    Vaping status: Never Used   Substance and Sexual Activity    Alcohol use: Yes     Comment: Socially    Drug use: No    Sexual activity: Not Currently     Comment: last active 2 yrs ago   Other Topics Concern    Pt has a pacemaker No    Pt has a defibrillator No    Reaction to local anesthetic No    Right Handed Yes          REVIEW OF SYSTEMS:   GENERAL: feels well otherwise  SKIN: denies any unusual skin lesions  EYES:denies blurred vision or double vision  HEENT: denies nasal congestion, sinus pain or ST  LUNGS: denies shortness of breath with exertion or cough  CARDIOVASCULAR: denies chest pain, pressure, or palpitations  GI: denies abdominal pain, nausea, vomiting, diarrhea, constipation, hematochezia, or melena  NEURO: denies headaches or dizziness    EXAM:   /90 (BP Location: Right arm, Patient Position: Sitting, Cuff Size: adult)   Pulse 88   Temp 98.4 °F (36.9 °C) (Oral)   Ht 5' 3.5\" (1.613 m)   Wt 160 lb (72.6 kg)   LMP 12/30/2024 (Exact Date)   SpO2 99%   BMI 27.90 kg/m²     GENERAL:  well developed, well nourished, in no apparent distress  HEENT: normal oropharynx, b/l impacted deeply impacted cerumen   EYES: PERRLA, EOMI, conjunctivae are pink  NECK: supple, no cervical or supraclavicular LAD, no carotid bruits  BREAST: no dominant or suspicious mass, no axillary LAD  LUNGS: clear to auscultation  CARDIO: RRR, normal S1S2, no gallops or murmurs  GI: soft, NT, ND, NABS, no HSM  EXTREMITIES: no cyanosis, clubbing or edema, +2 DP pulses        ASSESSMENT AND PLAN:     Routine health maintenance  -Pap/HPV negative 10/2022.  Next Pap in 10/2025  -still plans to see gyne (Dr. Leger or Dr. Sosa) to discuss BC  -UTD on Gardasil  -Td in 2018  -had flu vax  -check labs  -encouraged exercise    Iron deficiency anemia  Likely 2/2 heavy menses (still heavy)  -stopped SlowFe b/c forgot to take  -check repeat labs; will likely need to restart iron    Vitamin D deficiency  Check level  Stopped weekly vitamin D - will likely need to restart    Elevated blood pressure  -maternal aunts w/HTN  -repeat 136/92  -limit salt; encouraged getting back to the gym  -check BP at home BID  -check labs  -phone visit in 3-4 weeks

## 2025-01-03 ENCOUNTER — LAB ENCOUNTER (OUTPATIENT)
Dept: LAB | Facility: HOSPITAL | Age: 30
End: 2025-01-03
Attending: INTERNAL MEDICINE
Payer: COMMERCIAL

## 2025-01-03 DIAGNOSIS — R03.0 ELEVATED BLOOD PRESSURE READING: ICD-10-CM

## 2025-01-03 DIAGNOSIS — D50.0 IRON DEFICIENCY ANEMIA DUE TO CHRONIC BLOOD LOSS: ICD-10-CM

## 2025-01-03 DIAGNOSIS — E55.9 VITAMIN D DEFICIENCY: ICD-10-CM

## 2025-01-03 DIAGNOSIS — Z00.00 ROUTINE HEALTH MAINTENANCE: ICD-10-CM

## 2025-01-03 LAB
ALBUMIN SERPL-MCNC: 4.6 G/DL (ref 3.2–4.8)
ALBUMIN/GLOB SERPL: 1.5 {RATIO} (ref 1–2)
ALP LIVER SERPL-CCNC: 58 U/L
ALT SERPL-CCNC: 9 U/L
ANION GAP SERPL CALC-SCNC: 6 MMOL/L (ref 0–18)
AST SERPL-CCNC: 14 U/L (ref ?–34)
BASOPHILS # BLD AUTO: 0.03 X10(3) UL (ref 0–0.2)
BASOPHILS NFR BLD AUTO: 0.6 %
BILIRUB SERPL-MCNC: 0.6 MG/DL (ref 0.3–1.2)
BILIRUB UR QL: NEGATIVE
BUN BLD-MCNC: 13 MG/DL (ref 9–23)
BUN/CREAT SERPL: 15.1 (ref 10–20)
CALCIUM BLD-MCNC: 9.4 MG/DL (ref 8.7–10.4)
CHLORIDE SERPL-SCNC: 106 MMOL/L (ref 98–112)
CHOLEST SERPL-MCNC: 193 MG/DL (ref ?–200)
CLARITY UR: CLEAR
CO2 SERPL-SCNC: 27 MMOL/L (ref 21–32)
CREAT BLD-MCNC: 0.86 MG/DL
DEPRECATED HBV CORE AB SER IA-ACNC: 8 NG/ML
DEPRECATED RDW RBC AUTO: 42.6 FL (ref 35.1–46.3)
EGFRCR SERPLBLD CKD-EPI 2021: 94 ML/MIN/1.73M2 (ref 60–?)
EOSINOPHIL # BLD AUTO: 0.14 X10(3) UL (ref 0–0.7)
EOSINOPHIL NFR BLD AUTO: 2.7 %
ERYTHROCYTE [DISTWIDTH] IN BLOOD BY AUTOMATED COUNT: 13.2 % (ref 11–15)
FASTING PATIENT LIPID ANSWER: YES
FASTING STATUS PATIENT QL REPORTED: YES
GLOBULIN PLAS-MCNC: 3.1 G/DL (ref 2–3.5)
GLUCOSE BLD-MCNC: 90 MG/DL (ref 70–99)
GLUCOSE UR-MCNC: NORMAL MG/DL
HCT VFR BLD AUTO: 35.5 %
HDLC SERPL-MCNC: 82 MG/DL (ref 40–59)
HGB BLD-MCNC: 11.7 G/DL
HGB UR QL STRIP.AUTO: NEGATIVE
IMM GRANULOCYTES # BLD AUTO: 0.02 X10(3) UL (ref 0–1)
IMM GRANULOCYTES NFR BLD: 0.4 %
IRON SATN MFR SERPL: 10 %
IRON SERPL-MCNC: 49 UG/DL
KETONES UR-MCNC: NEGATIVE MG/DL
LDLC SERPL CALC-MCNC: 98 MG/DL (ref ?–100)
LEUKOCYTE ESTERASE UR QL STRIP.AUTO: NEGATIVE
LYMPHOCYTES # BLD AUTO: 2.05 X10(3) UL (ref 1–4)
LYMPHOCYTES NFR BLD AUTO: 38.8 %
MCH RBC QN AUTO: 28.8 PG (ref 26–34)
MCHC RBC AUTO-ENTMCNC: 33 G/DL (ref 31–37)
MCV RBC AUTO: 87.4 FL
MONOCYTES # BLD AUTO: 0.46 X10(3) UL (ref 0.1–1)
MONOCYTES NFR BLD AUTO: 8.7 %
NEUTROPHILS # BLD AUTO: 2.58 X10 (3) UL (ref 1.5–7.7)
NEUTROPHILS # BLD AUTO: 2.58 X10(3) UL (ref 1.5–7.7)
NEUTROPHILS NFR BLD AUTO: 48.8 %
NITRITE UR QL STRIP.AUTO: NEGATIVE
NONHDLC SERPL-MCNC: 111 MG/DL (ref ?–130)
OSMOLALITY SERPL CALC.SUM OF ELEC: 288 MOSM/KG (ref 275–295)
PH UR: 6.5 [PH] (ref 5–8)
PLATELET # BLD AUTO: 304 10(3)UL (ref 150–450)
POTASSIUM SERPL-SCNC: 3.7 MMOL/L (ref 3.5–5.1)
PROT SERPL-MCNC: 7.7 G/DL (ref 5.7–8.2)
PROT UR-MCNC: NEGATIVE MG/DL
RBC # BLD AUTO: 4.06 X10(6)UL
SODIUM SERPL-SCNC: 139 MMOL/L (ref 136–145)
SP GR UR STRIP: 1.02 (ref 1–1.03)
TIBC SERPL-MCNC: 474 UG/DL (ref 250–425)
TRANSFERRIN SERPL-MCNC: 318 MG/DL (ref 250–380)
TRIGL SERPL-MCNC: 69 MG/DL (ref 30–149)
TSI SER-ACNC: 1.02 UIU/ML (ref 0.55–4.78)
UROBILINOGEN UR STRIP-ACNC: NORMAL
VIT D+METAB SERPL-MCNC: 8.3 NG/ML (ref 30–100)
VLDLC SERPL CALC-MCNC: 11 MG/DL (ref 0–30)
WBC # BLD AUTO: 5.3 X10(3) UL (ref 4–11)

## 2025-01-03 PROCEDURE — 80053 COMPREHEN METABOLIC PANEL: CPT

## 2025-01-03 PROCEDURE — 82306 VITAMIN D 25 HYDROXY: CPT

## 2025-01-03 PROCEDURE — 36415 COLL VENOUS BLD VENIPUNCTURE: CPT | Performed by: INTERNAL MEDICINE

## 2025-01-03 PROCEDURE — 82728 ASSAY OF FERRITIN: CPT

## 2025-01-03 PROCEDURE — 83540 ASSAY OF IRON: CPT

## 2025-01-03 PROCEDURE — 81003 URINALYSIS AUTO W/O SCOPE: CPT | Performed by: INTERNAL MEDICINE

## 2025-01-03 PROCEDURE — 80061 LIPID PANEL: CPT

## 2025-01-03 PROCEDURE — 85025 COMPLETE CBC W/AUTO DIFF WBC: CPT

## 2025-01-03 PROCEDURE — 84466 ASSAY OF TRANSFERRIN: CPT

## 2025-01-03 PROCEDURE — 84443 ASSAY THYROID STIM HORMONE: CPT | Performed by: INTERNAL MEDICINE

## 2025-01-05 RX ORDER — METHYLDOPA 500 MG
160 TABLET ORAL DAILY
Qty: 30 TABLET | Refills: 0 | COMMUNITY
Start: 2025-01-05 | End: 2025-02-04

## 2025-01-05 RX ORDER — ERGOCALCIFEROL 1.25 MG/1
50000 CAPSULE, LIQUID FILLED ORAL WEEKLY
Qty: 13 CAPSULE | Refills: 3 | Status: SHIPPED | OUTPATIENT
Start: 2025-01-05

## 2025-01-23 ENCOUNTER — VIRTUAL PHONE E/M (OUTPATIENT)
Dept: INTERNAL MEDICINE CLINIC | Facility: CLINIC | Age: 30
End: 2025-01-23
Payer: COMMERCIAL

## 2025-01-23 DIAGNOSIS — D50.0 IRON DEFICIENCY ANEMIA DUE TO CHRONIC BLOOD LOSS: ICD-10-CM

## 2025-01-23 DIAGNOSIS — G47.9 SLEEPING DIFFICULTY: ICD-10-CM

## 2025-01-23 DIAGNOSIS — R03.0 ELEVATED BLOOD PRESSURE READING: Primary | ICD-10-CM

## 2025-01-23 PROCEDURE — 98013 SYNCH AUDIO-ONLY EST LOW 20: CPT | Performed by: INTERNAL MEDICINE

## 2025-01-23 RX ORDER — MAGNESIUM GLYCINATE 100 MG
100 CAPSULE ORAL DAILY
Qty: 1 CAPSULE | Refills: 0 | COMMUNITY
Start: 2025-01-23

## 2025-01-23 RX ORDER — BUTYROSPERMUM PARKII(SHEA BUTTER), SIMMONDSIA CHINENSIS (JOJOBA) SEED OIL, ALOE BARBADENSIS LEAF EXTRACT .01; 1; 3.5 G/100G; G/100G; G/100G
1 LIQUID TOPICAL EVERY OTHER DAY
Qty: 1 CAPSULE | Refills: 0 | COMMUNITY
Start: 2025-01-23

## 2025-01-23 NOTE — PROGRESS NOTES
Virtual Telephone Check-In    Cornelio Lew verbally consents to a Virtual/Telephone Check-In visit on 01/23/25.  Patient has been referred to the [facility] website at [email or web address] to review the yearly Consent to Treat document.    Patient understands and accepts financial responsibility for any deductible, co-insurance and/or co-pays associated with this service.    Duration of the service: 22 minutes      Summary of topics discussed:       F/u of BP  Pt has been checking at home.  1/2 - 117/89  1/3 - 119/88, 127/83  1/4 - 122/87, 126/86  1/6 - 126/84, 131/83  1/7 114/80  1/8 134/87  1/9 - 123/82, 125/87  1/10 - 112/68  1/12 - 132/87  1/13 -133/88  1/17- 129/89  1/18 - 134/86, 127/93  1/20 - 131/97  1/22 - 125/85    Has taken a hiatus off of exercise.  Plans to get back to it.  Has a gym membership.  No snoring.  Does not sleep     The iron (slow FE) is still messing with her GI tract - feeling constipation.      Elevated blood pressure  -maternal aunts w/HTN  -UA normal; no protein  -CMP normal (Na 139, K 3.7)  -TSH normal  -diastolic BP's still consistently high  -discussed treatment with medication -- pt would like to wait a little longer and get back to exercise; she will limit sodium in diet.   -RTC 3 mos - she will bring her BP machine with her    Sleep difficulty  -rec trial of Mg glycinate daily    Iron deficiency anemia 2/2 heavy menses  -constipation from the Slow Fe (ferrous sulfate)  -rec trial of Gentle Iron (ferrous glycinate) -- would take every other day only  -if still unable to tolerate, will give IV iron               Fabi Flynn MD

## 2025-03-18 ENCOUNTER — APPOINTMENT (OUTPATIENT)
Dept: GENERAL RADIOLOGY | Age: 30
End: 2025-03-18
Attending: NURSE PRACTITIONER
Payer: COMMERCIAL

## 2025-03-18 ENCOUNTER — TELEPHONE (OUTPATIENT)
Dept: INTERNAL MEDICINE CLINIC | Facility: CLINIC | Age: 30
End: 2025-03-18

## 2025-03-18 ENCOUNTER — HOSPITAL ENCOUNTER (OUTPATIENT)
Age: 30
Discharge: HOME | End: 2025-03-18
Payer: COMMERCIAL

## 2025-03-18 VITALS
SYSTOLIC BLOOD PRESSURE: 144 MMHG | RESPIRATION RATE: 20 BRPM | OXYGEN SATURATION: 100 % | TEMPERATURE: 98 F | HEART RATE: 88 BPM | DIASTOLIC BLOOD PRESSURE: 91 MMHG

## 2025-03-18 DIAGNOSIS — B34.9 VIRAL SYNDROME: Primary | ICD-10-CM

## 2025-03-18 DIAGNOSIS — R05.1 ACUTE COUGH: ICD-10-CM

## 2025-03-18 LAB
#MXD IC: 0.6 X10ˆ3/UL (ref 0.1–1)
ATRIAL RATE: 92 BPM
BUN BLD-MCNC: 11 MG/DL (ref 7–18)
CHLORIDE BLD-SCNC: 105 MMOL/L (ref 98–112)
CO2 BLD-SCNC: 25 MMOL/L (ref 21–32)
CREAT BLD-MCNC: 0.8 MG/DL
EGFRCR SERPLBLD CKD-EPI 2021: 103 ML/MIN/1.73M2 (ref 60–?)
GLUCOSE BLD-MCNC: 85 MG/DL (ref 70–99)
HCT VFR BLD AUTO: 38.8 %
HCT VFR BLD CALC: 41 %
HGB BLD-MCNC: 12.5 G/DL
ISTAT IONIZED CALCIUM FOR CHEM 8: 1.18 MMOL/L (ref 1.12–1.32)
LYMPHOCYTES # BLD AUTO: 1.8 X10ˆ3/UL (ref 1–4)
LYMPHOCYTES NFR BLD AUTO: 28.5 %
MCH RBC QN AUTO: 28.3 PG (ref 26–34)
MCHC RBC AUTO-ENTMCNC: 32.2 G/DL (ref 31–37)
MCV RBC AUTO: 87.8 FL (ref 80–100)
MIXED CELL %: 9.1 %
NEUTROPHILS # BLD AUTO: 3.9 X10ˆ3/UL (ref 1.5–7.7)
NEUTROPHILS NFR BLD AUTO: 62.4 %
P AXIS: 71 DEGREES
P-R INTERVAL: 148 MS
PLATELET # BLD AUTO: 317 X10ˆ3/UL (ref 150–450)
POTASSIUM BLD-SCNC: 4 MMOL/L (ref 3.6–5.1)
Q-T INTERVAL: 366 MS
QRS DURATION: 68 MS
QTC CALCULATION (BEZET): 452 MS
R AXIS: 72 DEGREES
RBC # BLD AUTO: 4.42 X10ˆ6/UL
SARS-COV-2 RNA RESP QL NAA+PROBE: NOT DETECTED
SODIUM BLD-SCNC: 140 MMOL/L (ref 136–145)
T AXIS: 61 DEGREES
TROPONIN I BLD-MCNC: <0.02 NG/ML
VENTRICULAR RATE: 92 BPM
WBC # BLD AUTO: 6.3 X10ˆ3/UL (ref 4–11)

## 2025-03-18 PROCEDURE — U0002 COVID-19 LAB TEST NON-CDC: HCPCS | Performed by: NURSE PRACTITIONER

## 2025-03-18 PROCEDURE — 71046 X-RAY EXAM CHEST 2 VIEWS: CPT | Performed by: NURSE PRACTITIONER

## 2025-03-18 PROCEDURE — 99214 OFFICE O/P EST MOD 30 MIN: CPT | Performed by: NURSE PRACTITIONER

## 2025-03-18 PROCEDURE — 93000 ELECTROCARDIOGRAM COMPLETE: CPT | Performed by: NURSE PRACTITIONER

## 2025-03-18 PROCEDURE — 85025 COMPLETE CBC W/AUTO DIFF WBC: CPT | Performed by: NURSE PRACTITIONER

## 2025-03-18 PROCEDURE — 84484 ASSAY OF TROPONIN QUANT: CPT | Performed by: NURSE PRACTITIONER

## 2025-03-18 PROCEDURE — 94640 AIRWAY INHALATION TREATMENT: CPT | Performed by: NURSE PRACTITIONER

## 2025-03-18 PROCEDURE — 80047 BASIC METABLC PNL IONIZED CA: CPT | Performed by: NURSE PRACTITIONER

## 2025-03-18 RX ORDER — BENZONATATE 100 MG/1
100 CAPSULE ORAL 3 TIMES DAILY PRN
Qty: 30 CAPSULE | Refills: 0 | Status: SHIPPED | OUTPATIENT
Start: 2025-03-18 | End: 2025-04-17

## 2025-03-18 RX ORDER — ALBUTEROL SULFATE 90 UG/1
2 INHALANT RESPIRATORY (INHALATION) EVERY 6 HOURS PRN
Qty: 1 EACH | Refills: 0 | Status: SHIPPED | OUTPATIENT
Start: 2025-03-18 | End: 2025-04-17

## 2025-03-18 RX ORDER — IPRATROPIUM BROMIDE AND ALBUTEROL SULFATE 2.5; .5 MG/3ML; MG/3ML
3 SOLUTION RESPIRATORY (INHALATION) ONCE
Status: COMPLETED | OUTPATIENT
Start: 2025-03-18 | End: 2025-03-18

## 2025-03-18 NOTE — DISCHARGE INSTRUCTIONS
Viral infections are usually the worst days 3-5, but can last up to 14 days.  You may develop or continue to cough for up to 3 weeks after.  Viral infections do not improve with the use of antibiotics.  Sabine diet, increase water intake; gatorade or pedialyte if not eating well.  Heating pad or warm compress to chest wall.  Ibuprofen or tylenol for pain, every 6 hours.  Runny Nose/Congestion:  -Cool mist Humidifier at bedside   -Vicks vaporub under nose and chest wall  - Nasal Rinse ([place]y [place])  - Flonase nasal spray to each nostril once or twice daily if needed  - Antihistamine (Allegra, Zyrtec, Claritin) once daily if needed  - Nasal Decongestant (Sudafed)  Cough:  - Mucinex OR Robitussin OR  -Tessalon perles three times a day for cough  -Albuterol inhaler 2 puffs every 6 hours for cough, bronchospasm, shortness of breath or wheezing  - Warm tea w/honey  Sore Throat:  - Salt water gargle 2x/day  -Throat lozenges  Fever:  Tylenol or Motrin every 6 hours for fever > 100.4. You can alternate between the two as well if fever high.  Follow up with your primary care provider in the next 1-2 weeks if symptoms persist.  Go to the emergency room with chest pain, shortness of breath, dizziness, vomiting and unable to keep down fluids/medications.

## 2025-03-18 NOTE — TELEPHONE ENCOUNTER
Spoke with pt.  Reporting chest tightness and difficulty breathing - advised UC eval for possible CXR.    Pt agreeable.    Nursing to f/up.     URI Triage:     Fever: Yes[]               No[x]                 Unknown[]  [] Temperature:   [] Chills   [] Night sweats  [] Body aches     Cough: Yes[x]              No[]  [x] Productive cough - Yellowish   [] Cough with exertion  [] Dry cough     Respiratory Symptoms: Yes[x]          No[]  [] Wheezing  [] Pain with deep breathing  [] SOB with exertion  [] SOB at rest  [] Heavy breathing  [x] Chest discomfort with deep breathing or coughing     GI Symptoms: Yes []            No[x]  [] Diarrhea  [] Nausea  [] Vomiting  [] Abdominal pain  [] Lack of appetite     Other symptoms:  [x] Sore throat  [] Difficulty swallowing  [] Sinus pressure  [] Nasal drainage  [x] Nasal congestion  [x] Chest congestion  [] Head congestion  [] PND  [] Facial pain   [] Ear pain  [] Conjunctivitis  [x] Headache  [] Fatigue  [] Weakness  [] Loss of sense of smell   [] Loss of sense of taste     [x]OTC Medications:  Theraflu   NyQuil/DayQuil      Symptom onset:  3 wks ago

## 2025-03-18 NOTE — TELEPHONE ENCOUNTER
Patient is calling 3 weeks ago she developed a cough, chest congestion & was losing her voice.  1 week ago she was feeling better but 2 days ago her symptoms have returned.    What does Dr Flynn recommend?    Phone [phone]

## 2025-03-18 NOTE — ED INITIAL ASSESSMENT (HPI)
Patient is here with a cough that she states is getting worse.  She states she is coughing up yellow phlegm.  She states the coughing is hurting her chest. She states she has been sick for three weeks.

## 2025-03-18 NOTE — ED PROVIDER NOTES
Patient Seen in: [facility]      History     Chief Complaint   Patient presents with    Chest Pain     Cough causing chest pain, coughing up yellowish phlegm, slight nasal congestion. - Entered by patient     Stated Complaint: Chest Pain - Cough causing chest pain, coughing up yellowish phlegm, slight aaliyah*    Subjective:   HPI    29 yr old female here for evaluation of worsening cough x 3 days, chest pain that has been intermittent at rest and with cough x 3 days. Midsternal, nonradiating. Denies abd pain, vomiting, diarrhea, back pain, dizziness. Reports cough, runny nose, congestion, sore throat, hoarse voice x 3 weeks, resolving 1 week ago, then symptoms now x 3 days. Has been taking dayquil, nyquil, theraflu at home with some relief. Denies self or family cardiac history, recent travel, sick contacts, hormone therapy, recent surgery or immobilization.    Objective:     Past Medical History:    Ganglion of left wrist    Excision ganglion of R dorsal wrist     Malocclusion with open bite    Malocclusion, Angle's class III    Psoriasis              Past Surgical History:   Procedure Laterality Date    Excis primary ganglion wrist Left 06/25/2019    Excision ganglion of R dorsal wrist     Jaw surgery  12/09/2020    double jaw surgery    Other surgical history Right around 2015    ganglion cyst removal    Other surgical history  12/2020    Jaw surgery    [place] teeth removed                  Social History     Socioeconomic History    Marital status: Single   Tobacco Use    Smoking status: Never    Smokeless tobacco: Never   Vaping Use    Vaping status: Never Used   Substance and Sexual Activity    Alcohol use: Yes     Comment: Socially    Drug use: No    Sexual activity: Not Currently     Comment: last active 2 yrs ago   Other Topics Concern    Pt has a pacemaker No    Pt has a defibrillator No    Reaction to local anesthetic No    Right Handed Yes              Review of Systems    Positive for stated  complaint: Chest Pain - Cough causing chest pain, coughing up yellowish phlegm, slight aaliyah*  Other systems are as noted in HPI.  Constitutional and vital signs reviewed.      All other systems reviewed and negative except as noted above.    Physical Exam     ED Triage Vitals [03/18/25 1314]   BP (!) 144/91   Pulse 88   Resp 20   Temp 98.1 °F (36.7 °C)   Temp src Oral   SpO2 100 %   O2 Device None (Room air)       Current Vitals:   Vital Signs  BP: (!) 144/91  Pulse: 88  Resp: 20  Temp: 98.1 °F (36.7 °C)  Temp src: Oral    Oxygen Therapy  SpO2: 100 %  O2 Device: None (Room air)        Physical Exam  Vitals and nursing note reviewed.   Constitutional:       General: She is not in acute distress.     Appearance: She is well-developed. She is not ill-appearing, toxic-appearing or diaphoretic.   HENT:      Head: Normocephalic and atraumatic.   Eyes:      General: Lids are normal.      Pupils: Pupils are equal, round, and reactive to light.   Cardiovascular:      Rate and Rhythm: Normal rate and regular rhythm.      Heart sounds: Normal heart sounds.   Pulmonary:      Effort: Pulmonary effort is normal. No tachypnea, accessory muscle usage, respiratory distress or retractions.      Breath sounds: Normal breath sounds and air entry. Transmitted upper airway sounds present. No stridor or decreased air movement. No decreased breath sounds, wheezing, rhonchi or rales.   Musculoskeletal:      Cervical back: Normal range of motion and neck supple.      Right lower leg: No edema.      Left lower leg: No edema.   Lymphadenopathy:      Cervical: No cervical adenopathy.   Skin:     General: Skin is warm.      Findings: No rash.   Neurological:      Mental Status: She is alert and oriented to person, place, and time.           ED Course     Labs Reviewed   POCT ISTAT CHEM8 CARTRIDGE - Normal   ISTAT TROPONIN - Normal   RAPID SARS-COV-2 BY PCR - Normal   POCT CBC       ED Course as of 03/18/25  1505  ------------------------------------------------------------  Time: 03/18 1338  Value: EKG 12 Lead  Comment: Normal sinus rhythm  Normal ECG  No previous ECGs found in [facility]      HR 92  NO STEMI  ------------------------------------------------------------  Time: 03/18 1346  Value: POCT CBC  Comment: (Reviewed)  ------------------------------------------------------------  Time: 03/18 1346  Value: POCT ISTAT chem8 cartridge  Comment: (Reviewed)  ------------------------------------------------------------  Time: 03/18 1346  Value: Rapid SARS-CoV-2 by PCR  Comment: (Reviewed)  ------------------------------------------------------------  Time: 03/18 1357  Value: XR CHEST PA + LAT CHEST ([id number]=[id number])  Comment: Impression  CONCLUSION: No radiographic evidence of acute cardiopulmonary process.    ------------------------------------------------------------  Time: 03/18 1406  Value: ISTAT Troponin  Comment: (Reviewed)              MDM     29 yr old with worsening cough x 3 days, chest pain on and off with cough and at rest x 3 days, cough cold symptoms x 3 weeks, improved then worse again.    ON exam, pt well appearing, course lung sounds throughout with upper airway sounds. BL TM normal. Pharynx clear with no erythema or swelling.     Differential diagnoses reflecting the complexity of care include but are not limited to ACS, PE, pneumonia, Viral URI w cough and chest congestion.    Comorbidities that add complexity to management include: none  History obtained by an independent source was from: patient  My independent interpretations of studies include: EKG NSR no STEMI    Shared decision making was done by: patient, myself  Discussions of management was done with: Dr Churchill attending at [place]  Patient is well appearing, non-toxic and in no acute distress.  Vital signs are stable.     CBC unremarkable  Chem neg  Trop neg  CXR negative for pneumonia  COVID NEG> duoneb given.    At re-eval LS clear, good air  movement now.    Discussed viral process, likely bronchitis from cough. Discussed albuterol as needed, tessalon perles, PO fluids. Rest.  PERC neg, Rhythm normal so unlikely ACS.  All questions answered. Return and ER precautions given.    Counseled: Patient, regarding diagnosis, regarding treatment plan, regarding diagnostic results, regarding prescription, I have discussed with the patient the results of tests, differential diagnosis, and warning signs and symptoms that should prompt immediate return. The patient understands these instructions and agrees to the follow-up plan provided. There is no barriers to learning. Appropriate f/u given. Patient agrees to return for any concerns/ problems/complications.          Medical Decision Making      Disposition and Plan     Clinical Impression:  1. Viral syndrome    2. Acute cough         Disposition:  Discharge  3/18/2025  2:07 pm    Follow-up:  Fabi Flynn MD  [address] [place] [id number]  [phone]    Schedule an appointment as soon as possible for a visit in 1 week  If symptoms worsen          Medications Prescribed:  Discharge Medication List as of 3/18/2025  2:07 PM        START taking these medications    Details   benzonatate 100 MG Oral Cap Take 1 capsule (100 mg total) by mouth 3 (three) times daily as needed for cough., Normal, Disp-30 capsule, R-0      albuterol 108 (90 Base) MCG/ACT Inhalation Aero Soln Inhale 2 puffs into the lungs every 6 (six) hours as needed (bronchospasm, shortness of breath or wheezing)., Normal, Disp-1 each, R-0                 Supplementary Documentation:

## 2025-03-19 NOTE — TELEPHONE ENCOUNTER
To  -patient was seen in  for chest pain- then ER    Clinical Impression:  1. Viral syndrome    2. Acute cough       enzonatate 100 MG Oral Cap Take 1 capsule (100 mg total) by mouth 3 (three) times daily as needed for cough., Normal, Disp-30 capsule, R-0       albuterol 108 (90 Base) MCG/ACT Inhalation Aero Soln Inhale 2 puffs into the lungs every 6 (six) hours as needed (bronchospasm, shortness of breath or wheezing)., Normal, Disp-1 each, R-0

## (undated) DIAGNOSIS — Z01.818 PREOP EXAMINATION: ICD-10-CM

## (undated) DIAGNOSIS — M67.432 GANGLION OF LEFT WRIST: ICD-10-CM

## (undated) DEVICE — BANDAGE COBAN 5X2 TAN LTX

## (undated) DEVICE — GAMMEX® PI HYBRID SIZE 7, STERILE POWDER-FREE SURGICAL GLOVE, POLYISOPRENE AND NEOPRENE BLEND: Brand: GAMMEX

## (undated) DEVICE — SPLINT ORTH 12X2IN 1 LYR CMFT

## (undated) DEVICE — CAST PADDING SYNTH 2IN

## (undated) DEVICE — SOL H2O 1000ML BTL

## (undated) DEVICE — ZIMMER® STERILE DISPOSABLE TOURNIQUET CUFF WITH PLC, DUAL PORT, DUAL BLADDER, 18 IN. (46 CM)

## (undated) DEVICE — GOWN SURG AERO BLUE PERF LG

## (undated) DEVICE — PLASTIC HAND: Brand: MEDLINE INDUSTRIES, INC.

## (undated) DEVICE — SUTURE ETHILON 4-0 699G

## (undated) DEVICE — SOL  .9 1000ML BTL

## (undated) DEVICE — SUTURE SURGILON 4-0 SBS1880G

## (undated) DEVICE — STERILE TETRA-FLEX CF, ELASTIC BANDAGE, 2" X 5.5YD: Brand: TETRA-FLEX™CF

## (undated) NOTE — LETTER
21      Patient: Alessandro Brown  : 1995 Visit date: 2021    Dear Hansel Langley,      I examined your patient in consultation today.     She has an asymptomatic ganglion of the left wrist.  Nothing need be done for this at present

## (undated) NOTE — LETTER
01/10/19      Patient: Janee Griffith  : 1995 Visit date: 1/10/2019    Dear Valerie Arellano,      I examined your patient in consultation today. She has an enlarging painful ganglion of the left wrist.  We will plan on excision.     Thank lauren

## (undated) NOTE — LETTER
19      Patient: Reyna Reynoso  : 1995 Visit date: 6/3/2019    Dear Cuauhtemoc Chua,      I examined your patient in consultation today. Her left wrist ganglion has become more painful. We will plan on excision.     Thank you for yo

## (undated) NOTE — LETTER
19      Patient: Ciro Robb  : 1995 Visit date: 2019    Dear Angelina Bradley,      I examined your patient in follow-up today. She is 3 weeks post excision of a painful ganglion of the left wrist.    She is pain-free.   She